# Patient Record
Sex: MALE | Race: WHITE | NOT HISPANIC OR LATINO
[De-identification: names, ages, dates, MRNs, and addresses within clinical notes are randomized per-mention and may not be internally consistent; named-entity substitution may affect disease eponyms.]

---

## 2022-02-08 ENCOUNTER — NON-APPOINTMENT (OUTPATIENT)
Age: 32
End: 2022-02-08

## 2022-02-08 ENCOUNTER — APPOINTMENT (OUTPATIENT)
Dept: NEUROLOGY | Facility: CLINIC | Age: 32
End: 2022-02-08
Payer: MEDICAID

## 2022-02-08 VITALS
WEIGHT: 150 LBS | BODY MASS INDEX: 19.88 KG/M2 | HEIGHT: 73 IN | DIASTOLIC BLOOD PRESSURE: 78 MMHG | SYSTOLIC BLOOD PRESSURE: 118 MMHG | HEART RATE: 69 BPM | TEMPERATURE: 97.2 F

## 2022-02-08 DIAGNOSIS — Z72.89 OTHER PROBLEMS RELATED TO LIFESTYLE: ICD-10-CM

## 2022-02-08 DIAGNOSIS — Z72.0 TOBACCO USE: ICD-10-CM

## 2022-02-08 PROCEDURE — 99204 OFFICE O/P NEW MOD 45 MIN: CPT

## 2022-02-09 PROBLEM — Z72.89 ALCOHOL USE: Status: ACTIVE | Noted: 2022-02-08

## 2022-02-09 PROBLEM — Z72.0 CURRENT NICOTINE USE: Status: ACTIVE | Noted: 2022-02-09

## 2022-02-09 NOTE — REVIEW OF SYSTEMS
[Feelings Of Weakness] : feelings of weakness [Fever] : no fever [Feeling Tired] : not feeling tired [Anxiety] : no anxiety [Depression] : no depression [Numbness] : no numbness [Dizziness] : no dizziness [Migraine Headache] : no migraine headache [Red Eyes] : eyes not red [Dry Eyes] : no dryness of the eyes [Loss Of Hearing] : no hearing loss [Nosebleeds] : no nosebleeds [Chest Pain] : no chest pain [Palpitations] : no palpitations [Shortness Of Breath] : no shortness of breath [Cough] : no cough [Constipation] : no constipation [Diarrhea] : no diarrhea [Genital Lesion] : no genital lesions [Joint Pain] : no joint pain [Joint Swelling] : no joint swelling [Itching] : no itching [Dry Skin] : no dry skin [Muscle Weakness] : no muscle weakness [Easy Bleeding] : no tendency for easy bleeding

## 2022-02-09 NOTE — DISCUSSION/SUMMARY
[FreeTextEntry1] : Raymundo is a pleasant 31-year-old man with past medical history of alcohol use, current smoker with at least 3 clinical events concerning for nocturnal seizures. Unclear if precipitated by alcohol use but the fact \par that he has 3 events, and the fact that they are nocturnal increases the likelihood that he carries a diagnosis of epilepsy. Suspect frontal lobe seizures. Will need to start medication soon but will first do EEG off medications. Discussed importance of limiting alcohol use and the high risk of serious injury with seizures including but not limited to brain bleed, shoulder dislocation, death. Nocturnal convulsive seizures are associated with SUDEP. Will need to do investigative studies and start AED soon. Likely will restart lamotrigine with another medication to bridge such as oxcarbazepine or levetiracetam. He may have some depression so oxcarbazepine may be best.

## 2022-02-09 NOTE — CONSULT LETTER
[Dear  ___] : Dear  [unfilled], [Consult Letter:] : I had the pleasure of evaluating your patient, [unfilled]. [Please see my note below.] : Please see my note below. [Sincerely,] : Sincerely, [FreeTextEntry3] : Radha Strickland MD \par [facility]

## 2022-02-09 NOTE — ASSESSMENT
[FreeTextEntry1] : Please get routine and ambulatory EEG (24 hour EEG) \par Call me after EEG - will review and will likely restart AED such as lamotrigine with oxcarbazepine bridge\par No driving\par Discussed seizure safety and limiting alcohol use \par Call immediately if seizure, call 911 if seizure > 5 minutes or if multiple seizures without return to baseline \par \par Return to clinic in one month \par \par \par .

## 2022-02-09 NOTE — PHYSICAL EXAM
[FreeTextEntry1] : Mental Status:alert, oriented. Speech fluent. Euthymic affect.\par CN: visual acuity, VFF, blink to confrontation \par III, IV, VI, PERRL, EOMI \par V sensation normal to light touch, pinprick\par VII normal squint vs resistance, normal smile, face symmetric \par VIII: normal hearing \par IX, X normal gag, symmetric palate, uvula raises midline \par XI normal shrug versus resistance and lateralization of head versus resistance \par XII tongue symmetric, normal strength, no tremor or fasciculation \par Sensory: normal to LT \par Motor: full strength \par Gait : normal balance and gait \par \par \par

## 2022-02-09 NOTE — HISTORY OF PRESENT ILLNESS
[FreeTextEntry1] : Raymundo Carlson is a 31-year-old right-handed man with a past medical history of seizures who is is presenting to [facility] care. He is not currently taking any medications. \par He previously saw Dr. Lizbeth Harrington, neurologist. \par \par With regard to seizure history, he had an episode in his mid-twenties when he woke up out of sleep. He had bitten his tongue. All the things in his living quarters were in disarray. \par The event was not witnessed. He urinated on himself. 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 He had chronic left shoulder pain which \par felt worse in the morning. He is not sure what happened but saw a neurologist who put him lamotrigine. He tolerated it well. He has had MRIs in the past but is not sure what they showed. He\par had EEG in the past, not sure what it showed. He had sleep studies as well which showed "frequent arousals from REM sleep". \par \par 2 years later, he had another episode where he had rolled out of bed. He hit his head on a heavy weight. He had staples. He bit his tongue and urinated on himself. He had knocked everything over. \par Dr. Harrington increased the dose of lamotrigine. He thought it also helped with his mood. \par \par 2 weeks ago, he had another event. He had been drinking alcohol and using cocaine. He laid down in a cot. His friend had mentioned that he had been walking around \par and speaking in a strange voice, confused. His entire body was sore. He is not sure what happened but now his upper back between his shoulder blades and his lower back hurt as \par well as his right shoulder. \par \par Family history: \par \par \par Family History\par Mother - alive - none \par Father - alive - depression/anxiety \par sister - alive - full sibling- none \par \par Seizure risk factors \par No history of febrile seizure \par umbical cord - , cord around neck\par born 10 days early - no nicu stay \par walking early. 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

## 2022-02-10 ENCOUNTER — APPOINTMENT (OUTPATIENT)
Dept: NEUROLOGY | Facility: CLINIC | Age: 32
End: 2022-02-10

## 2022-02-11 ENCOUNTER — APPOINTMENT (OUTPATIENT)
Dept: NEUROLOGY | Facility: CLINIC | Age: 32
End: 2022-02-11

## 2022-03-05 ENCOUNTER — RX RENEWAL (OUTPATIENT)
Age: 32
End: 2022-03-05

## 2022-03-06 ENCOUNTER — RX RENEWAL (OUTPATIENT)
Age: 32
End: 2022-03-06

## 2022-03-28 ENCOUNTER — APPOINTMENT (OUTPATIENT)
Dept: NEUROLOGY | Facility: CLINIC | Age: 32
End: 2022-03-28

## 2022-04-27 ENCOUNTER — APPOINTMENT (OUTPATIENT)
Dept: NEUROSURGERY | Facility: CLINIC | Age: 32
End: 2022-04-27
Payer: MEDICAID

## 2022-05-04 ENCOUNTER — APPOINTMENT (OUTPATIENT)
Dept: NEUROSURGERY | Facility: CLINIC | Age: 32
End: 2022-05-04
Payer: MEDICAID

## 2022-05-04 VITALS
HEIGHT: 73 IN | WEIGHT: 155 LBS | HEART RATE: 99 BPM | OXYGEN SATURATION: 94 % | TEMPERATURE: 98.2 F | DIASTOLIC BLOOD PRESSURE: 69 MMHG | SYSTOLIC BLOOD PRESSURE: 107 MMHG | BODY MASS INDEX: 20.54 KG/M2

## 2022-05-04 PROCEDURE — 99205 OFFICE O/P NEW HI 60 MIN: CPT

## 2022-05-04 NOTE — DATA REVIEWED
[de-identified] [facility]: [facility] mild compression deformity at T7 with multiple additional chronic compression deformities in the upper to midthoracic spine. [de-identified] : [facility]29[facility]\par There are multiple moderate compression fractures of the upper to midthoracic spine approximately and possible mild superior endplate compression of a midthoracic vertebral body. The alignment is normal.

## 2022-05-04 NOTE — ASSESSMENT
[FreeTextEntry1] : I have discussed the natural history and treatment options for T7 compression fracture with the patient and his father. I explained the indications for observation and imaging surveillance, medical management, brace and surgery (kyphoplasty). I discussed the risks, benefits, possible complications and expected outcome related to each treatment option. In the end, I recommend the patient wears a brace. It would also be reasonable to treat with kyphoplasty for pain relief as the MRI of the L spine with STIR sequence reveals a subacute component. The patient will think about these options and get back to us with his decision. If he decides to proceed with kyphoplasty,  I will refer him to my partner Dr. Cheryle Parks for the procedure and our office will reach out to him to schedule an appointment. He should follow up with his orthopedic surgeon for his right shoulder pain. The patient understands the plan of care and is in agreement.  All questions answered to patient satisfaction.\par

## 2022-05-04 NOTE — HISTORY OF PRESENT ILLNESS
[de-identified] : NICOLE RUIZ is a 31 year male with a PMH of seizure disorder, Lyme disease, anxiety, depression, Etoh use, current smoker who presents to the office today with his father for neurosurgical consultation due to thoracic compression fractures sustained during recent seizure.  Nocturnal seizure disorder diagnosed at age 14, was on lamotrigine and self dc'd a few years ago. Had a significant nocturnal seizure on 1/25/22 and then developed mid-back pain and difficulty walking. Pain was steadily improving and then the patient had another seizure 1 months ago which again resulted in severe mid-back pain which is not improving. He is now following with Dr. Strickland for seizure disorder and back on lamotrigine. He also reports Right shoulder pain.The back pain is characterized as stabbing and burning in nature, 7/10 in severity, and localized to the upper to mid back.  It started 3 months ago. It is exacerbated by sitting, standing and lying down and relieved by cannabis and Etoh.  The patient denies numbness of extremities, weakness of extremities, bowel or bladder incontinence and gait disturbance.  He has tried pain medications including Mobic without relief. He has undergone imaging in the form of Xray Thoracic spine on 3/29/22 and MRI Thoracic Spine on 4/29/22 at [facility] (images in [facility]) which revealed subacute mild compression deformity at T7 with multiple additional chronic compression deformities in the upper to midthoracic spine (see detailed report scanned in EMR).\par PMH: per HPI\par PSH:testicular torsion age 7, Left shoulder surgery 2015\par Social Hx: pack/day smoker since age 14, drinks alcohol >10 beers/week, smokes marijuana, lives at home with parents, unemployed\par Allergies: NKDA\par Medications: lamotragine 50mg BID, Mobic 15mg QD, denies AC or ASA\par \par

## 2022-05-04 NOTE — END OF VISIT
[FreeTextEntry3] : I have seen the patient and reviewed the case together with PA and I agree with the final recommendations and plan of care.\par \par Arash Zelaya MD\par Neurosurgery\par \par  [Time Spent: ___ minutes] : I have spent [unfilled] minutes of time on the encounter. [>50% of the face to face encounter time was spent on counseling and/or coordination of care for ___] : Greater than 50% of the face to face encounter time was spent on counseling and/or coordination of care for [unfilled]

## 2022-05-09 ENCOUNTER — NON-APPOINTMENT (OUTPATIENT)
Age: 32
End: 2022-05-09

## 2022-05-18 ENCOUNTER — APPOINTMENT (OUTPATIENT)
Dept: NEUROLOGY | Facility: CLINIC | Age: 32
End: 2022-05-18
Payer: MEDICAID

## 2022-05-18 VITALS
SYSTOLIC BLOOD PRESSURE: 129 MMHG | BODY MASS INDEX: 20.54 KG/M2 | WEIGHT: 155 LBS | HEIGHT: 73 IN | HEART RATE: 88 BPM | DIASTOLIC BLOOD PRESSURE: 75 MMHG

## 2022-05-18 DIAGNOSIS — Z87.898 PERSONAL HISTORY OF OTHER SPECIFIED CONDITIONS: ICD-10-CM

## 2022-05-18 PROCEDURE — 99215 OFFICE O/P EST HI 40 MIN: CPT

## 2022-05-18 NOTE — HISTORY OF PRESENT ILLNESS
[FreeTextEntry1] : Last seen in clinic on 22. Presents to clinic with dad. Did not complete ambulatory EEG and discontinued oxcarbazepine. He states that he could not tolerate it. He is taking lamotrigine 75 mg twice a day now but reports that it makes him almost too level, as if he can't feel anything. He sometimes feels depressed and admits to drinking about 15 beers a week, on average 3-4 beers or more a day.\par \par He unfortunately sustained another seizure in 2022? It occurred out of sleep. He doesn't remember it but experienced severe back pain and had bit both sides of his tongue. He remembers being stressed about a potential love interest and drank more than usual. He also smokes marijuana (not as much edibles) but does this less frequently than drinking. \par \par He has also been driving. Lives with parents. Not working. \par \par Does have history of depression and is feeling depressed now. \par On 22 and imaging revealed a compression fracture of T7 vertebra. This was likely in setting of seizure in January and then worsened by seizure in March. He saw Dr. Zelaya who reviewed imaging and recommended a brace (not wearing today) and consideration of possible kyphoplasty. \par \par Medication Failed: \par Oxcarbazepine: felt depressed \par \par ________________________________\par 22 \par Raymundo Carlson is a 31-year-old right-handed man with a past medical history of seizures who is is presenting to [facility] care. He is not currently taking any medications. \par He previously saw Dr. Lizbeth Harrington, neurologist. \par \par With regard to seizure history, he had an episode in his mid-twenties when he woke up out of sleep. He had bitten his tongue. All the things in his living quarters were in disarray. \par The event was not witnessed. He urinated on himself. 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 He had chronic left shoulder pain which \par felt worse in the morning. He is not sure what happened but saw a neurologist who put him lamotrigine. He tolerated it well. He has had MRIs in the past but is not sure what they showed. He\par had EEG in the past, not sure what it showed. He had sleep studies as well which showed "frequent arousals from REM sleep". \par \par 2 years later, he had another episode where he had rolled out of bed. He hit his head on a heavy weight. He had staples. He bit his tongue and urinated on himself. He had knocked everything over. \par Dr. Harrington increased the dose of lamotrigine. He thought it also helped with his mood. \par \par 2 weeks ago, he had another event. He had been drinking alcohol and using cocaine. He laid down in a cot. His friend had mentioned that he had been walking around \par and speaking in a strange voice, confused. His entire body was sore. He is not sure what happened but now his upper back between his shoulder blades and his lower back hurt as \par well as his right shoulder. \par \par Family history: \par \par \par Family History\par Mother - alive - none \par Father - alive - depression/anxiety \par sister - alive - full sibling- none \par \par Seizure risk factors \par No history of febrile seizure \par umbical cord - , cord around neck\par born 10 days early - no nicu stay \par walking early. 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

## 2022-05-18 NOTE — PHYSICAL EXAM
[FreeTextEntry1] : Mental Status:alert, oriented. Speech fluent. Euthymic affect.\par CN: visual acuity, VFF, blink to confrontation \par III, IV, VI, PERRL, EOMI \par V sensation normal to light touch, pinprick\par VII normal squint vs resistance, normal smile, face symmetric \par VIII: normal hearing \par IX, X normal gag, symmetric palate, uvula raises midline \par XI normal shrug versus resistance and lateralization of head versus resistance \par XII tongue symmetric, normal strength, no tremor or fasciculation \par Sensory: normal to LT \par Motor: full strength in upper and lower extremities \par Reflexes: 2+ throughout \par Coordination: intact FNF\par Gait : normal balance and gait \par \par \par

## 2022-05-18 NOTE — ASSESSMENT
[FreeTextEntry1] : Please start zonisamide 100 mg daily at night \par Please increase lamotrigine: \par Week 1: lamotrigine 75 mg in the morning 100 mg at night \par Week 2: lamotrigine 100 mg twice a day\par Week 3: lamotrigine 125 mg in the morning and 100 mg at night \par Week 4: lamotrigine 125 mg twice a day \par Week 5: lamotrigine 150 mg in morning and 125 mg at night \par Week 6: lamotrigine 150 mg twice a day - continue at this dose \par \par No driving \par Take vitamin b complex daily and vitamin D daily \par \par Return to clinic in 1-2 months

## 2022-05-18 NOTE — DISCUSSION/SUMMARY
[FreeTextEntry1] : Raymundo is a pleasant 31-year-old man with past medical history of alcohol use, current smoker with at least 3 clinical events concerning for nocturnal seizures. Unclear if precipitated by alcohol use but the fact \par that he has 3 events, and the fact that they are nocturnal increases the likelihood that he carries a diagnosis of epilepsy. Suspect frontal lobe seizures however no EEG data. Discussed importance of limiting alcohol use and the high risk of serious injury with seizures including but not limited to brain bleed, shoulder dislocation, death. Nocturnal convulsive seizures are associated with SUDEP. Started lamotrigine and oxcarbazepine. He self-discontinued oxcarbazepine. He is drinking 15 beers a week and sustained another seizure in March 2022 , also in setting of alcohol use. He was found to have a T7 compression fracture. Seen by Dr. Zelaya, recommended for brace and consideration of kyphoplasty. \par

## 2022-05-30 NOTE — REASON FOR VISIT
[Follow-Up: _____] : a [unfilled] follow-up visit [Other: _____] : [unfilled] [FreeTextEntry1] : Pt states he had 1 seizure episode since last visit due to not taking medication, but since then has restarted taking medication. Yes

## 2022-06-07 ENCOUNTER — RX RENEWAL (OUTPATIENT)
Age: 32
End: 2022-06-07

## 2022-07-07 ENCOUNTER — APPOINTMENT (OUTPATIENT)
Dept: NEUROLOGY | Facility: CLINIC | Age: 32
End: 2022-07-07

## 2022-07-07 VITALS
HEART RATE: 80 BPM | WEIGHT: 155 LBS | BODY MASS INDEX: 20.99 KG/M2 | SYSTOLIC BLOOD PRESSURE: 122 MMHG | HEIGHT: 72 IN | DIASTOLIC BLOOD PRESSURE: 75 MMHG

## 2022-07-07 PROCEDURE — 99215 OFFICE O/P EST HI 40 MIN: CPT

## 2022-07-07 NOTE — ASSESSMENT
[FreeTextEntry1] : \par Continue lamotrigine 150 mg twice a day \par Continue zonisamide 100 mg at night \par Take daily vitamin b complex\par Take vitamin d 1000 units daily \par \par Please get an EEG  -we will call you to schedule\par Please do labs

## 2022-07-07 NOTE — HISTORY OF PRESENT ILLNESS
[FreeTextEntry1] : Presenting with father to clinic. Doing well. No interval events concerning for seizure. trying to be mostly compliant with medications. Tolerating them well. Feels less depressed. More hopeful. A lot of social stressors. Dad, who is present in the office today, has treatment refractory depression. \par \par Raymundo is trying to cut down on alcohol, drinks ~1-2 beers, not more a day, most days of the week. Smokes marijuana. Not driving. \par \par Current AEDS. \par zonisamide 100 mg qd \par lamotrigine 150 mg bid \par __________________\par \par Last seen in clinic on 22. Presents to clinic with dad. Did not complete ambulatory EEG and discontinued oxcarbazepine. He states that he could not tolerate it. He is taking lamotrigine 75 mg twice a day now but reports that it makes him almost too level, as if he can't feel anything. He sometimes feels depressed and admits to drinking about 15 beers a week, on average 3-4 beers or more a day.\par \par He unfortunately sustained another seizure in 2022? It occurred out of sleep. He doesn't remember it but experienced severe back pain and had bit both sides of his tongue. He remembers being stressed about a potential love interest and drank more than usual. He also smokes marijuana (not as much edibles) but does this less frequently than drinking. \par \par He has also been driving. Lives with parents. Not working. \par [place] Does have history of depression and is feeling depressed now. \par on 22 and imaging revealed a compression fracture of T7 vertebra. This was likely in setting of seizure in January and then worsened by seizure in March. He saw Dr. Zelaya who reviewed imaging and recommended a brace (not wearing today) and consideration of possible kyphoplasty. \par \par ________________________________\par 22 \par Raymundo Carlson is a 31-year-old right-handed man with a past medical history of seizures who is is presenting to [facility]. He is not currently taking any medications. \par He previously saw Dr. Lizbeth Harrington, neurologist. \par \par With regard to seizure history, he had an episode in his mid-twenties when he woke up out of sleep. He had bitten his tongue. All the things in his living quarters were in disarray. \par The event was not witnessed. He urinated on himself. 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 He had chronic left shoulder pain which \par felt worse in the morning. He is not sure what happened but saw a neurologist who put him lamotrigine. He tolerated it well. He has had MRIs in the past but is not sure what they showed. He\par had EEG in the past, not sure what it showed. He had sleep studies as well which showed "frequent arousals from REM sleep". \par \par 2 years later, he had another episode where he had rolled out of bed. He hit his head on a heavy weight. He had staples. He bit his tongue and urinated on himself. He had knocked everything over. \par Dr. Harrington increased the dose of lamotrigine. He thought it also helped with his mood. \par \par 2 weeks ago, he had another event. He had been drinking alcohol and using cocaine. He laid down in a cot. His friend had mentioned that he had been walking around \par and speaking in a strange voice, confused. His entire body was sore. He is not sure what happened but now his upper back between his shoulder blades and his lower back hurt as \par well as his right shoulder. \par \par Family history: \par \par \par Family History\par Mother - alive - none \par Father - alive - depression/anxiety \par sister - alive - full sibling- none \par \par Seizure risk factors \par No history of febrile seizure \par umbical cord - , cord around neck\par born 10 days early - no nicu stay \par walking early. 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

## 2022-07-07 NOTE — DISCUSSION/SUMMARY
[FreeTextEntry1] : Raymundo is a pleasant 31-year-old man with past medical history of alcohol use, current smoker with at least 3 clinical events concerning for nocturnal seizures. Unclear if precipitated by alcohol use but the fact \par that he has 3 events, and the fact that they are nocturnal increases the likelihood that he carries a diagnosis of epilepsy. Suspect frontal lobe seizures however no EEG data. Discussed importance of limiting alcohol use and the high risk of serious injury with seizures including but not limited to brain bleed, shoulder dislocation, death. Nocturnal convulsive seizures are associated with SUDEP. Started lamotrigine and oxcarbazepine. He self-discontinued oxcarbazepine. He is drinking 15 beers a week and sustained another seizure in March 2022 , also in setting of alcohol use. He was found to have a T7 compression fracture. Seen by Dr. Zelaya, recommended for brace and consideration of kyphoplasty. \par \par Doing well now on zonisamide 100 mg daily and lamotrigine 150 mg twice a day. Will continue. \par \par \par

## 2022-07-07 NOTE — PHYSICAL EXAM
[FreeTextEntry1] : Mental Status:alert, oriented. Speech fluent. Euthymic affect.\par CN: visual acuity, VFF, blink to confrontation \par III, IV, VI, PERRL, EOMI \par V sensation normal to light touch, pinprick\par VII normal squint vs resistance, normal smile, face symmetric \par VIII: normal hearing \par IX, X normal gag, symmetric palate, uvula raises midline \par XI normal shrug versus resistance and lateralization of head versus resistance \par XII tongue symmetric, normal strength, no tremor or fasciculation \par Sensory: normal to LT \par Motor: full strength , no tremor \par Reflexes: 2+ throughout \par Coordination: intact FNF\par Gait : normal balance and gait \par \par \par

## 2022-08-09 ENCOUNTER — APPOINTMENT (OUTPATIENT)
Dept: NEUROLOGY | Facility: CLINIC | Age: 32
End: 2022-08-09

## 2022-08-10 ENCOUNTER — APPOINTMENT (OUTPATIENT)
Dept: NEUROLOGY | Facility: CLINIC | Age: 32
End: 2022-08-10

## 2022-09-16 ENCOUNTER — APPOINTMENT (OUTPATIENT)
Dept: NEUROLOGY | Facility: CLINIC | Age: 32
End: 2022-09-16

## 2022-09-16 VITALS
DIASTOLIC BLOOD PRESSURE: 71 MMHG | OXYGEN SATURATION: 98 % | HEIGHT: 72 IN | BODY MASS INDEX: 20.99 KG/M2 | WEIGHT: 155 LBS | HEART RATE: 81 BPM | TEMPERATURE: 98.7 F | SYSTOLIC BLOOD PRESSURE: 107 MMHG

## 2022-09-16 LAB
25(OH)D3 SERPL-MCNC: 27.9 NG/ML
ALBUMIN SERPL ELPH-MCNC: 4.9 G/DL
ALP BLD-CCNC: 55 U/L
ALT SERPL-CCNC: 19 U/L
ANION GAP SERPL CALC-SCNC: 10 MMOL/L
AST SERPL-CCNC: 18 U/L
BILIRUB SERPL-MCNC: 0.3 MG/DL
BUN SERPL-MCNC: 13 MG/DL
CALCIUM SERPL-MCNC: 10.1 MG/DL
CHLORIDE SERPL-SCNC: 107 MMOL/L
CO2 SERPL-SCNC: 26 MMOL/L
CREAT SERPL-MCNC: 0.8 MG/DL
EGFR: 120 ML/MIN/1.73M2
GLUCOSE SERPL-MCNC: 99 MG/DL
LAMOTRIGINE SERPL-MCNC: 5.1 UG/ML
POTASSIUM SERPL-SCNC: 4.7 MMOL/L
PROT SERPL-MCNC: 7.2 G/DL
SODIUM SERPL-SCNC: 143 MMOL/L
ZONISAMIDE SERPL-MCNC: <2 UG/ML

## 2022-09-16 PROCEDURE — 99215 OFFICE O/P EST HI 40 MIN: CPT

## 2022-09-16 NOTE — ASSESSMENT
[FreeTextEntry1] : Call Dr. Zelaya's office and make a follow-up for persistent back pain\par Continue physical therapy\par Continue to cut down on alcohol \par Continue lamotrigine 150 mg twice a day\par Continue zonisamide 100 mg at night \par Continue vitamin D 1000 units daily \par Please get ambulatory EEG\par No driving \par \par Return to clinic in November, call if seizure\par  \par \par

## 2022-09-16 NOTE — PHYSICAL EXAM
[FreeTextEntry1] : Mental Status:alert, oriented. Speech fluent. Euthymic affect.\par CN: visual acuity, VFF, blink to confrontation \par III, IV, VI, PERRL, EOMI \par V sensation normal to light touch, pinprick\par VII normal squint vs resistance, normal smile, face symmetric \par VIII: normal hearing \par IX, X normal gag, symmetric palate, uvula raises midline \par XI normal shrug versus resistance and lateralization of head versus resistance \par XII tongue symmetric, normal strength, no tremor or fasciculation \par Sensory: normal to LT \par Motor: full strength , no tremor. Normal bulk and tone\par Reflexes: 2+ upper extremities, 3+ patellar b/l, 2+ ankle jerks b/l\par Coordination: intact FNF\par Gait : normal balance and gait \par \par \par

## 2022-09-16 NOTE — DISCUSSION/SUMMARY
[FreeTextEntry1] : Raymundo is a pleasant 31-year-old man with past medical history of alcohol abuse, current smoker with at least 3 clinical events concerning for nocturnal seizures. Unclear if precipitated by alcohol use but the fact that he has 3 events, and the fact that they are nocturnal increases the likelihood that he carries a diagnosis of epilepsy. Suspect frontal lobe seizures however no EEG data. Discussed importance of limiting alcohol use and the high risk of serious injury with seizures including but not limited to brain bleed, shoulder dislocation, death. Nocturnal convulsive seizures are associated with SUDEP. Started lamotrigine and oxcarbazepine. He self-discontinued oxcarbazepine. He was drinking 15 beers a week and sustained another seizure in March 2022 , also in setting of alcohol use. He was found to have a T7 compression fracture. Seen by Dr. Zelaya, recommended for brace and consideration of kyphoplasty. \par \par Doing well now on zonisamide 100 mg daily and lamotrigine 150 mg twice a day. Will continue. Has not gotten EEG yet. Still with pain in back that was worse from when he saw Dr. Zelaya. No concerning findings on neurologic exam. WIll refer back to him. \par \par Zonisamide level was low (<2). Discussed importance of compliance - and risks including worsening of fracture were he to have a convulsive seizure. Discussed importance of not driving. \par \par \par

## 2022-09-16 NOTE — HISTORY OF PRESENT ILLNESS
[FreeTextEntry1] : Last seen in clinic on 22. No interval events concerning for seizure. Admits to missing a couple doses of zonisamide. Cutting down on drinking. Now has less than a six pack a week, still drinks 3-4 times a week. Smokes marijuana. Trying to cut down on cigarettes from 10 to 5. Going to physical therapy. Worsening back pain - it is bad in upper back and under ribs and sternum.  Tried to wear brace and it made things worse. Has not gotten EEG. Drove intermittently to the grocery store. Lives with parents. Not working. Doing yard work and helping around the house. Mood is better. Can tolerate these medications. Going to physical therapy. \par \par Current Medications: \par lamotrigine 150 mg twice a day \par zonisamide 100 mg at night \par ______________________________________\par 22 \par Presenting with father to clinic. Doing well. No interval events concerning for seizure. trying to be mostly compliant with medications. Tolerating them well. Feels less depressed. More hopeful. A lot of social stressors. Dad, who is present in the office today, has treatment refractory depression. \par \par Raymundo is trying to cut down on alcohol, drinks ~1-2 beers, not more a day, most days of the week. Smokes marijuana. Not driving. \par \par Current AEDS. \par zonisamide 100 mg qd \par lamotrigine 150 mg bid \par __________________\par \par Last seen in clinic on 22. Presents to clinic with dad. Did not complete ambulatory EEG and discontinued oxcarbazepine. He states that he could not tolerate it. He is taking lamotrigine 75 mg twice a day now but reports that it makes him almost too level, as if he can't feel anything. He sometimes feels depressed and admits to drinking about 15 beers a week, on average 3-4 beers or more a day.\par \par He unfortunately sustained another seizure in 2022? It occurred out of sleep. He doesn't remember it but experienced severe back pain and had bit both sides of his tongue. He remembers being stressed about a potential love interest and drank more than usual. He also smokes marijuana (not as much edibles) but does this less frequently than drinking. \par \par He has also been driving. Lives with parents. Not working. \par \jesús Does have history of depression and is feeling depressed now. \par on 22 and imaging revealed a compression fracture of T7 vertebra. This was likely in setting of seizure in January and then worsened by seizure in March. He saw Dr. Zelaya who reviewed imaging and recommended a brace (not wearing today) and consideration of possible kyphoplasty. \par \par ________________________________\par 22 \par Raymundo Carlson is a 31-year-old right-handed man with a past medical history of seizures who is is presenting to [facility] care. He is not currently taking any medications. \par He previously saw Dr. Lizbeth Harrington, neurologist. \par \par With regard to seizure history, he had an episode in his mid-twenties when he woke up out of sleep. He had bitten his tongue. All the things in his living quarters were in disarray. \par The event was not witnessed. He urinated on himself. 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 He had chronic left shoulder pain which \par felt worse in the morning. He is not sure what happened but saw a neurologist who put him lamotrigine. He tolerated it well. He has had MRIs in the past but is not sure what they showed. He\par had EEG in the past, not sure what it showed. He had sleep studies as well which showed "frequent arousals from REM sleep". \par \par 2 years later, he had another episode where he had rolled out of bed. He hit his head on a heavy weight. He had staples. He bit his tongue and urinated on himself. He had knocked everything over. \par Dr. Harrington increased the dose of lamotrigine. He thought it also helped with his mood. \par \par 2 weeks ago, he had another event. He had been drinking alcohol and using cocaine. He laid down in a cot. His friend had mentioned that he had been walking around \par and speaking in a strange voice, confused. His entire body was sore. He is not sure what happened but now his upper back between his shoulder blades and his lower back hurt as \par well as his right shoulder. \par \par Family history: \par \par \par Family History\par Mother - alive - none \par Father - alive - depression/anxiety \par sister - alive - full sibling- none \par \par Seizure risk factors \par No history of febrile seizure \par umbical cord - , cord around neck\par born 10 days early - no nicu stay \par walking early. 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

## 2022-09-16 NOTE — DATA REVIEWED
[de-identified] : Labs: 9/2022 - lamotrigine 5.1 ; zonisamide <2   ; vitamin D 27.9 low \par 4/29/22: MRI thoracic spine: subacute mild compression deformity at T7 with multiple additional compression deformities in \par upper to midthoracic spine. \par 6/29/21 \par Na 140 \par creatinine 0.9

## 2022-09-19 LAB — OXCARBAZEPINE SERPL-MCNC: <1 UG/ML

## 2022-09-23 ENCOUNTER — NON-APPOINTMENT (OUTPATIENT)
Age: 32
End: 2022-09-23

## 2022-11-03 ENCOUNTER — APPOINTMENT (OUTPATIENT)
Dept: NEUROLOGY | Facility: CLINIC | Age: 32
End: 2022-11-03

## 2022-11-04 ENCOUNTER — APPOINTMENT (OUTPATIENT)
Dept: NEUROLOGY | Facility: CLINIC | Age: 32
End: 2022-11-04

## 2023-01-06 DIAGNOSIS — S22.080A WEDGE COMPRESSION FRACTURE OF T11-T12 VERTEBRA, INITIAL ENCOUNTER FOR CLOSED FRACTURE: ICD-10-CM

## 2023-01-06 DIAGNOSIS — S32.010A WEDGE COMPRESSION FRACTURE OF T11-T12 VERTEBRA, INITIAL ENCOUNTER FOR CLOSED FRACTURE: ICD-10-CM

## 2023-01-23 ENCOUNTER — APPOINTMENT (OUTPATIENT)
Dept: NEUROSURGERY | Facility: CLINIC | Age: 33
End: 2023-01-23
Payer: MEDICAID

## 2023-01-24 ENCOUNTER — RX RENEWAL (OUTPATIENT)
Age: 33
End: 2023-01-24

## 2023-01-31 ENCOUNTER — RESULT REVIEW (OUTPATIENT)
Age: 33
End: 2023-01-31

## 2023-02-06 ENCOUNTER — APPOINTMENT (OUTPATIENT)
Dept: NEUROSURGERY | Facility: CLINIC | Age: 33
End: 2023-02-06
Payer: MEDICAID

## 2023-02-06 VITALS
DIASTOLIC BLOOD PRESSURE: 87 MMHG | WEIGHT: 161 LBS | BODY MASS INDEX: 21.81 KG/M2 | OXYGEN SATURATION: 98 % | HEIGHT: 72 IN | HEART RATE: 97 BPM | SYSTOLIC BLOOD PRESSURE: 121 MMHG

## 2023-02-06 PROCEDURE — 99215 OFFICE O/P EST HI 40 MIN: CPT

## 2023-02-06 NOTE — ASSESSMENT
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 I explained the indications for observation and imaging surveillance, medical management, brace and surgery (kyphoplasty). I discussed the risks, benefits, possible complications and expected outcome related to each treatment option.\par \par In the end, I recommend conservative management in the form of physical therapy and pain management.  The patient may return to the office as needed at this point.  The patient understands the plan of care and is in agreement.  All questions answered to patient satisfaction.\par \par \par

## 2023-02-06 NOTE — PHYSICAL EXAM
[FreeTextEntry1] : Constitutional: alert and in no acute distress. \par Cranial Nerves: visual acuity intact bilaterally, pupils equal round and reactive to light, extraocular motion intact, facial sensation intact symmetrically, face symmetrical, hearing was intact bilaterally, tongue and palate midline, head turning and shoulder shrug symmetric and there was no tongue deviation with protrusion. \par Motor: muscle tone was normal in all four extremities, muscle strength was normal in all four extremities and normal bulk in all four extremities. \par +Tend to palpation over multiple levels thoracic spine\par \par \par

## 2023-02-06 NOTE — HISTORY OF PRESENT ILLNESS
[FreeTextEntry1] : Mr. Ruiz was seen in neurosurgical follow up.  Previous notes in the medical record and interval history reviewed with the patient.  In summary, he is a 32 year-old male with seizure disorder seen in our office last year after sustaining a T7 compression fracture as the result of a seizure episode.  He was offered referral for kyphoplasty at that time but instead opted for conservative treatment modalities.  He has been experiencing persistent back pain for approximately 3 months that occurred after a PT session.  He presents today with updated imaging consisting of MRI T/L spine (full details below).  He reports no extremity numbness/weakness, gait instability, or bowel/bladder incontinence.  \par \par 5/4/22: NICOLE RUIZ is a 31 year male with a PMH of seizure disorder, Lyme disease, anxiety, depression, Etoh use, current smoker who presents to the office today with his father for neurosurgical consultation due to thoracic compression fractures sustained during recent seizure. Nocturnal seizure disorder diagnosed at age 14, was on lamotrigine and self dc'd a few years ago. Had a significant nocturnal seizure on 1/25/22 and then developed mid-back pain and difficulty walking. Pain was steadily improving and then the patient had another seizure 1 months ago which again resulted in severe mid-back pain which is not improving. He is now following with Dr. Strickland for seizure disorder and back on lamotrigine. He also reports Right shoulder pain.The back pain is characterized as stabbing and burning in nature, 7/10 in severity, and localized to the upper to mid back. It started 3 months ago. It is exacerbated by sitting, standing and lying down and relieved by cannabis and Etoh. The patient denies numbness of extremities, weakness of extremities, bowel or bladder incontinence and gait disturbance. He has tried pain medications including Mobic without relief. He has undergone imaging in the form of Xray Thoracic spine on 3/29/22 and MRI Thoracic Spine on 4/29/22 at [facility] (images in [employer]) which revealed subacute mild compression deformity at T7 with multiple additional chronic compression deformities in the upper to midthoracic spine (see detailed report scanned in EMR).\par PSH:testicular torsion age 7, Left shoulder surgery 2015\par Social Hx: pack/day smoker since age 14, drinks alcohol >10 beers/week, smokes marijuana, lives at home with parents, unemployed\par Allergies: NKDA\par Medications: lamotragine 50mg BID, Mobic 15mg QD, denies AC or ASA

## 2023-02-06 NOTE — DATA REVIEWED
[de-identified] : \par  MRI Report             Final\par \par No Documents Attached\par \par \par \par \par   [facility]\par                                          [address]\par                                    [place], [place]  [address]\par                                        Department of Radiology\par                                             [phone]\par \par \par Patient Name:      NICOLE RUIZ                 Location:       [facility]\par Med Rec #:        [id number]                    Account #:      [id number]\par YOB: 1990                    Ordering:       Natasha Perez\par Age: 32               Sex:    M                 Attending:      Natasha Perez\par PCP:        Lowell Velez MD\par ______________________________________________________________________________________\par \par Exam Date:      01/31/23\par Exam:         MRI LUMBAR SPINE; MRI THORACIC SPINE\par \par Order#:       [id number]; [id number]\par \par \par \par CLINICAL INFORMATION: Back pain\par \par  TECHNIQUE: Multiplanar, multisequence MRI was performed of the lumbar spine.\par  IV Contrast: NONE\par \par  PRIOR STUDIES: No priors available for comparison.\par \par  FINDINGS:\par \par  LOCALIZER: No additional findings.\par  BONES: There is no fracture or bone marrow edema. There are chronic appearing compression\par deformities of T4, T5, T6 and T7 as well as superior endplate deformities of T2, T3 and T8.\par  ALIGNMENT: The alignment is normal.\par  SACROILIAC JOINTS/SACRUM: There is no sacral fracture. The SI joints are partially visualized but\par are intact.\par  CONUS AND CAUDA EQUINA: The distal cord and conus are normal in signal. Conus terminates at L1.\par  VISUALIZED INTRAPELVIC/INTRA-ABDOMINAL SOFT TISSUES: Normal.\par  PARASPINAL SOFT TISSUES: Normal.\par \par \par  INDIVIDUAL LEVELS:\par \par  THORACIC SPINE: No spinal canal or neuroforaminal stenosis.\par \par  L1-L2: No spinal canal or neuroforaminal stenosis.\par  L2-L3: No spinal canal or neuroforaminal stenosis.\par  L3-L4: No spinal canal or neuroforaminal stenosis.\par  L4-L5: No spinal canal or neuroforaminal stenosis.\par  L5-S1: Broad-based posterior disc bulge and moderate bilateral facet arthropathy result in mild\par bilateral neural foraminal narrowing but no significant central canal narrowing.\par \par \par \par  IMPRESSION:\par \par  No acute fracture or subluxation of the thoracic or lumbar spine.\par \par  Chronic compression deformities of several upper/mid thoracic vertebral bodies.\par \par  Mild bilateral neural foraminal narrowing at L5-S1 secondary to posterior disc bulge and facet\par arthropathy. No additional areas of significant central canal or neural foraminal narrowing within\par the thoracic or lumbar spine.\par \par  --- End of Report ---\par \par ***Electronically Signed ***\par -----------------------------------------------\par NICHOLAS MARROQUIN MD              02/01/23 1354\par \par Dictated on 02/01/23\par \par \par Report cc:  Natasha Perez;\par \par  \par \par  Ordered by: NATASHA PEREZ       Collected/Examined: [id number] 05:14PM       \par Verification Required       Stage: Final       \par  Performed at: [facility]       Resulted: 01Feb2023 01:54PM       Last Updated: 01Feb2023 01:58PM       Accession: [id number]

## 2023-02-10 ENCOUNTER — APPOINTMENT (OUTPATIENT)
Dept: NEUROLOGY | Facility: CLINIC | Age: 33
End: 2023-02-10
Payer: MEDICAID

## 2023-02-10 VITALS
HEIGHT: 72 IN | BODY MASS INDEX: 21.81 KG/M2 | DIASTOLIC BLOOD PRESSURE: 84 MMHG | WEIGHT: 161 LBS | OXYGEN SATURATION: 98 % | SYSTOLIC BLOOD PRESSURE: 132 MMHG | HEART RATE: 100 BPM

## 2023-02-10 PROCEDURE — 99215 OFFICE O/P EST HI 40 MIN: CPT

## 2023-02-10 RX ORDER — LAMOTRIGINE 100 MG/1
100 TABLET ORAL
Qty: 60 | Refills: 3 | Status: DISCONTINUED | COMMUNITY
Start: 2022-06-07 | End: 2023-02-10

## 2023-02-10 RX ORDER — CHOLECALCIFEROL (VITAMIN D3) 25 MCG
25 MCG TABLET ORAL DAILY
Qty: 30 | Refills: 5 | Status: DISCONTINUED | COMMUNITY
Start: 2022-07-07 | End: 2023-02-10

## 2023-02-10 RX ORDER — LAMOTRIGINE 25 MG/1
25 TABLET ORAL
Qty: 120 | Refills: 0 | Status: DISCONTINUED | COMMUNITY
Start: 2022-02-10 | End: 2023-02-10

## 2023-02-10 RX ORDER — LAMOTRIGINE 150 MG/1
150 TABLET ORAL TWICE DAILY
Qty: 180 | Refills: 3 | Status: DISCONTINUED | COMMUNITY
Start: 2022-10-03 | End: 2023-02-10

## 2023-02-10 RX ORDER — OXCARBAZEPINE 300 MG/1
300 TABLET, FILM COATED ORAL
Qty: 120 | Refills: 0 | Status: DISCONTINUED | COMMUNITY
Start: 2022-02-10 | End: 2023-02-10

## 2023-02-10 RX ORDER — ZONISAMIDE 100 MG/1
100 CAPSULE ORAL
Qty: 30 | Refills: 1 | Status: DISCONTINUED | COMMUNITY
Start: 2022-06-07 | End: 2023-02-10

## 2023-02-10 RX ORDER — LAMOTRIGINE 150 MG/1
150 TABLET ORAL TWICE DAILY
Qty: 60 | Refills: 3 | Status: DISCONTINUED | COMMUNITY
Start: 2022-07-07 | End: 2023-02-10

## 2023-02-10 RX ORDER — B-COMPLEX WITH VITAMIN C
TABLET ORAL DAILY
Qty: 30 | Refills: 5 | Status: ACTIVE | COMMUNITY
Start: 2022-05-18 | End: 1900-01-01

## 2023-02-10 RX ORDER — ZONISAMIDE 100 MG/1
100 CAPSULE ORAL
Qty: 30 | Refills: 1 | Status: DISCONTINUED | COMMUNITY
Start: 2022-05-18 | End: 2023-02-10

## 2023-02-10 RX ORDER — LAMOTRIGINE 100 MG/1
100 TABLET ORAL
Qty: 60 | Refills: 1 | Status: DISCONTINUED | COMMUNITY
Start: 2022-05-18 | End: 2023-02-10

## 2023-02-10 RX ORDER — LAMOTRIGINE 25 MG/1
25 TABLET ORAL
Qty: 320 | Refills: 0 | Status: DISCONTINUED | COMMUNITY
Start: 2022-05-09 | End: 2023-02-10

## 2023-02-10 NOTE — DATA REVIEWED
[de-identified] : 2/1/23: no acute fracture or subluxation of the thoracic or lumbar spine. Chronic compression \par deformities of several upper/mid thoracic vertebral bodies. Mild bilateral foraminal narrowing \par at L5/S1 secondary to posterior disc bulge and facet arthropathy. No additional areas of significant \par central canal or neural foraminal narrowing within the thoracic or lumbar spine.  [de-identified] : Labs: 9/2022 - lamotrigine 5.1 ; zonisamide <2   ; vitamin D 27.9 low \par 4/29/22: MRI thoracic spine: subacute mild compression deformity at T7 with multiple additional compression deformities in \par upper to midthoracic spine. \par 6/29/21 \par Na 140 \par creatinine 0.9

## 2023-02-10 NOTE — PHYSICAL EXAM
[FreeTextEntry1] : Mental Status:alert, oriented. Speech fluent. Euthymic affect.\par CN: visual acuity, VFF, blink to confrontation \par III, IV, VI, PERRL, EOMI \par V sensation normal to light touch, pinprick\par VII normal squint vs resistance, normal smile, face symmetric \par VIII: normal hearing \par IX, X normal gag, symmetric palate, uvula raises midline \par XI normal shrug versus resistance and lateralization of head versus resistance \par XII tongue symmetric, normal strength, no tremor or fasciculation \par Sensory: normal to LT \par Motor: full strength , no tremor. Normal bulk and tone\par Coordination: intact FNF\par Gait : normal balance and gait \par \par \par

## 2023-02-10 NOTE — HISTORY OF PRESENT ILLNESS
[FreeTextEntry1] : Presents to clinic for follow-up with dad. Admits to sometimes missing dose of lamotrigine. No definite convulsive seizures. Did not get EEG. \par Woke up this morning with back hurting. Not sure if he had a seizure.\par He followed up with Dr. Zelaya for chronic thoracic compression fracture. He recommended conservative management with physical therapy an pain management. Raymundo is somewhat reluctant to go to physical therapy because he thought last time, some back manipulation made him worse. He smokes marijuana, drives locally and has cut down drinking to a couple beers a couple times a week (compared to many beers daily). \par Lives with parents. \par \par Current AED: \par zonisamide 100 mg qhs\par lamotrigine 150 mg bid \par _______________________\par 22 \par Last seen in clinic on 22. No interval events concerning for seizure. Admits to missing a couple doses of zonisamide. Cutting down on drinking. Now has less than a six pack a week, still drinks 3-4 times a week. Smokes marijuana. Trying to cut down on cigarettes from 10 to 5. Going to physical therapy. Worsening back pain - it is bad in upper back and under ribs and sternum.  Tried to wear brace and it made things worse. Has not gotten EEG. Drove intermittently to the grocery store. Lives with parents. Not working. Doing yard work and helping around the house. Mood is better. Can tolerate these medications. Going to physical therapy. \par \par Current Medications: \par lamotrigine 150 mg twice a day \par zonisamide 100 mg at night \par ______________________________________\par 22 \par Presenting with father to clinic. Doing well. No interval events concerning for seizure. trying to be mostly compliant with medications. Tolerating them well. Feels less depressed. More hopeful. A lot of social stressors. Dad, who is present in the office today, has treatment refractory depression. \par \par Raymundo is trying to cut down on alcohol, drinks ~1-2 beers, not more a day, most days of the week. Smokes marijuana. Not driving. \par \par Current AEDS. \par zonisamide 100 mg qd \par lamotrigine 150 mg bid \par __________________\par \par Last seen in clinic on 22. Presents to clinic with dad. Did not complete ambulatory EEG and discontinued oxcarbazepine. He states that he could not tolerate it. He is taking lamotrigine 75 mg twice a day now but reports that it makes him almost too level, as if he can't feel anything. He sometimes feels depressed and admits to drinking about 15 beers a week, on average 3-4 beers or more a day.\par \par He unfortunately sustained another seizure in 2022? It occurred out of sleep. He doesn't remember it but experienced severe back pain and had bit both sides of his tongue. He remembers being stressed about a potential love interest and drank more than usual. He also smokes marijuana (not as much edibles) but does this less frequently than drinking. \par \par He has also been driving. Lives with parents. Not working. \par \par Does have history of depression and is feeling depressed now. \par on 22 and imaging revealed a compression fracture of T7 vertebra. This was likely in setting of seizure in January and then worsened by seizure in March. He saw Dr. Zelaya who reviewed imaging and recommended a brace (not wearing today) and consideration of possible kyphoplasty. \par \par ________________________________\par 22 \par Raymundo Carlson is a 31-year-old right-handed man with a past medical history of seizures who is is [facility]. He is not currently taking any medications. \par He previously saw Dr. Lizbeth Harrington, neurologist. \par \par With regard to seizure history, he had an episode in his mid-twenties when he woke up out of sleep. He had bitten his tongue. All the things in his living quarters were in disarray. \par The event was not witnessed. He urinated on himself. 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 He had chronic left shoulder pain which \par felt worse in the morning. He is not sure what happened but saw a neurologist who put him lamotrigine. He tolerated it well. He has had MRIs in the past but is not sure what they showed. He\par had EEG in the past, not sure what it showed. He had sleep studies as well which showed "frequent arousals from REM sleep". \par \par 2 years later, he had another episode where he had rolled out of bed. He hit his head on a heavy weight. He had staples. He bit his tongue and urinated on himself. He had knocked everything over. \par Dr. Harrington increased the dose of lamotrigine. He thought it also helped with his mood. \par \par 2 weeks ago, he had another event. He had been drinking alcohol and using cocaine. He laid down in a cot. His friend had mentioned that he had been walking around \par and speaking in a strange voice, confused. His entire body was sore. He is not sure what happened but now his upper back between his shoulder blades and his lower back hurt as \par well as his right shoulder. \par \par Family history: \par \par \par Family History\par Mother - alive - none \par Father - alive - depression/anxiety \par sister - alive - full sibling- none \par \par Seizure risk factors \par No history of febrile seizure \par umbical cord - , cord around neck\par born 10 days early - no nicu stay \par walking early. 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

## 2023-02-14 LAB
25(OH)D3 SERPL-MCNC: 14.6 NG/ML
ALBUMIN SERPL ELPH-MCNC: 5.1 G/DL
ALP BLD-CCNC: 61 U/L
ALT SERPL-CCNC: 30 U/L
ANION GAP SERPL CALC-SCNC: 9 MMOL/L
AST SERPL-CCNC: 20 U/L
BILIRUB SERPL-MCNC: 0.4 MG/DL
BUN SERPL-MCNC: 8 MG/DL
CALCIUM SERPL-MCNC: 9.7 MG/DL
CHLORIDE SERPL-SCNC: 104 MMOL/L
CO2 SERPL-SCNC: 27 MMOL/L
CREAT SERPL-MCNC: 0.76 MG/DL
EGFR: 121 ML/MIN/1.73M2
GLUCOSE SERPL-MCNC: 87 MG/DL
LAMOTRIGINE SERPL-MCNC: 5.4 UG/ML
POTASSIUM SERPL-SCNC: 4.5 MMOL/L
PROT SERPL-MCNC: 7.3 G/DL
SODIUM SERPL-SCNC: 141 MMOL/L
ZONISAMIDE SERPL-MCNC: <2 UG/ML

## 2023-02-21 ENCOUNTER — FORM ENCOUNTER (OUTPATIENT)
Age: 33
End: 2023-02-21

## 2023-02-21 ENCOUNTER — APPOINTMENT (OUTPATIENT)
Dept: PAIN MANAGEMENT | Facility: CLINIC | Age: 33
End: 2023-02-21
Payer: MEDICAID

## 2023-02-21 VITALS
TEMPERATURE: 98 F | DIASTOLIC BLOOD PRESSURE: 71 MMHG | SYSTOLIC BLOOD PRESSURE: 109 MMHG | BODY MASS INDEX: 21.2 KG/M2 | RESPIRATION RATE: 16 BRPM | HEIGHT: 73 IN | OXYGEN SATURATION: 99 % | WEIGHT: 160 LBS

## 2023-02-21 PROCEDURE — 99204 OFFICE O/P NEW MOD 45 MIN: CPT

## 2023-02-21 RX ORDER — METHOCARBAMOL 500 MG/1
500 TABLET, FILM COATED ORAL
Qty: 25 | Refills: 1 | Status: ACTIVE | COMMUNITY
Start: 2023-02-21 | End: 1900-01-01

## 2023-02-21 NOTE — CONSULT LETTER
[Dear  ___] : Dear  [unfilled], [Consult Letter:] : I had the pleasure of evaluating your patient, [unfilled]. [Please see my note below.] : Please see my note below. [Consult Closing:] : Thank you very much for allowing me to participate in the care of this patient.  If you have any questions, please do not hesitate to contact me. [Sincerely,] : Sincerely, [FreeTextEntry3] : Darrin Michelle DO

## 2023-02-21 NOTE — PHYSICAL EXAM
[de-identified] : General: Well-developed and well-nourished.  No acute distress.\par Psychiatric: Behavior was cooperative  \par Head:  Normocephalic and atraumatic\par Eyes:  Sclera white. Conjunctiva and eyelids pink and moist without discharge.\par Cardiovascular:  Regular\par Respiratory:  Trachea midline. Normal effort.\par No accessory muscle use with respiration\par Abdomen: Non distended, soft and nontender\par Skin:  No rashes, ulcers, or lesions appreciated.\par Neck: There is no pain with extension,     ROM wnl\par Back  There ++ no pain with extension,   ROM limited by pain\par Extremities: No edema \par Musculoskeletal: Moving all extremities freely \par Neuro: CN 2-12 Grossly intact\par Sensation to light touch is intact in all extremities\par Gait: Ambulates with no assistive device.

## 2023-02-21 NOTE — REVIEW OF SYSTEMS
[Back Pain] : back pain [Muscle Pain] : muscle pain [Radiating Pain] : radiating pain [Joint Stiffness] : joint stiffness [Decreased ROM] : decreased range of motion

## 2023-02-21 NOTE — DATA REVIEWED
[FreeTextEntry1] : MRI LUMBAR SPINE; MRI THORACIC SPINE \par \par Order#: MRI [id number]; [id number] \par \par \par \par CLINICAL INFORMATION: Back pain \par \par  TECHNIQUE: Multiplanar, multisequence MRI was performed of the lumbar spine. \par  IV Contrast: NONE \par \par  PRIOR STUDIES: No priors available for comparison. \par \par  FINDINGS: \par \par  LOCALIZER: No additional findings. \par  BONES: There is no fracture or bone marrow edema. There are chronic appearing compression \par deformities of T4, T5, T6 and T7 as well as superior endplate deformities of T2, T3 and T8. \par  ALIGNMENT: The alignment is normal. \par  SACROILIAC JOINTS/SACRUM: There is no sacral fracture. The SI joints are partially visualized but \par are intact. \par  CONUS AND CAUDA EQUINA: The distal cord and conus are normal in signal. Conus terminates at L1. \par  VISUALIZED INTRAPELVIC/INTRA-ABDOMINAL SOFT TISSUES: Normal. \par  PARASPINAL SOFT TISSUES: Normal. \par \par \par  INDIVIDUAL LEVELS: \par \par  THORACIC SPINE: No spinal canal or neuroforaminal stenosis. \par \par  L1-L2: No spinal canal or neuroforaminal stenosis. \par  L2-L3: No spinal canal or neuroforaminal stenosis. \par  L3-L4: No spinal canal or neuroforaminal stenosis. \par  L4-L5: No spinal canal or neuroforaminal stenosis. \par  L5-S1: Broad-based posterior disc bulge and moderate bilateral facet arthropathy result in mild \par bilateral neural foraminal narrowing but no significant central canal narrowing. \par \par \par \par  IMPRESSION: \par \par  No acute fracture or subluxation of the thoracic or lumbar spine. \par \par  Chronic compression deformities of several upper/mid thoracic vertebral bodies. \par \par  Mild bilateral neural foraminal narrowing at L5-S1 secondary to posterior disc bulge and facet \par arthropathy. No additional areas of significant central canal or neural foraminal narrowing within \par the thoracic or lumbar spine.

## 2023-02-21 NOTE — HISTORY OF PRESENT ILLNESS
[Back Pain] : back pain [FreeTextEntry1] : HPI - Mr. NICOLE RUIZ is a 32 year M with PHx seizure d/o as below, referred by Dr Zelaya who presents today with chief complaint of back pain. Reports that it developed 3 months ago It is located lumbar spine ;  there is radiation of the pain into the shoulder blade. The pain is presently 10/10 in severity on the numerical rating scale. It is sharp in nature. The pain is constant. There is diurnal worsening, during the course of the day. The pain is aggravated with standing, walking, climbing stairs The pain improves with rest. The pain is functionally and emotionally disabling for the patient as its preventing them from going about activities of daily living, such as routine housework. The pain does impair the patient’s ability to sleep. \par \par Patient has  NOT been seen by pain management in the past. \par Patient reports 6 weeks of provider directed treatment, including physical therapy\par Patient reports treatment that occurred within the last 3 months \par \par Reports there is no present numbness, there is no weakness. Patient denies any bowel/bladder incontinence, no saddle/perineal anesthesia or any other red flag signs or symptoms. Reports regular BMs.\par

## 2023-02-22 ENCOUNTER — APPOINTMENT (OUTPATIENT)
Dept: NEUROLOGY | Facility: CLINIC | Age: 33
End: 2023-02-22
Payer: MEDICAID

## 2023-02-22 PROCEDURE — 95816 EEG AWAKE AND DROWSY: CPT

## 2023-02-23 ENCOUNTER — APPOINTMENT (OUTPATIENT)
Dept: NEUROLOGY | Facility: CLINIC | Age: 33
End: 2023-02-23

## 2023-02-23 PROCEDURE — 95708 EEG WO VID EA 12-26HR UNMNTR: CPT

## 2023-02-23 PROCEDURE — 95700 EEG CONT REC W/VID EEG TECH: CPT

## 2023-02-23 PROCEDURE — 95719 EEG PHYS/QHP EA INCR W/O VID: CPT

## 2023-03-28 ENCOUNTER — APPOINTMENT (OUTPATIENT)
Dept: FAMILY MEDICINE | Facility: CLINIC | Age: 33
End: 2023-03-28
Payer: MEDICAID

## 2023-03-28 VITALS
OXYGEN SATURATION: 98 % | BODY MASS INDEX: 21.2 KG/M2 | DIASTOLIC BLOOD PRESSURE: 70 MMHG | SYSTOLIC BLOOD PRESSURE: 110 MMHG | WEIGHT: 160 LBS | HEIGHT: 73 IN | TEMPERATURE: 98 F | HEART RATE: 97 BPM

## 2023-03-28 DIAGNOSIS — M79.10 MYALGIA, UNSPECIFIED SITE: ICD-10-CM

## 2023-03-28 DIAGNOSIS — Z11.3 ENCOUNTER FOR SCREENING FOR INFECTIONS WITH A PREDOMINANTLY SEXUAL MODE OF TRANSMISSION: ICD-10-CM

## 2023-03-28 DIAGNOSIS — M54.14 RADICULOPATHY, THORACIC REGION: ICD-10-CM

## 2023-03-28 DIAGNOSIS — S22.060A WEDGE COMPRESSION FRACTURE OF T7-T8 VERTEBRA, INITIAL ENCOUNTER FOR CLOSED FRACTURE: ICD-10-CM

## 2023-03-28 PROCEDURE — G0444 DEPRESSION SCREEN ANNUAL: CPT | Mod: 59

## 2023-03-28 PROCEDURE — 99385 PREV VISIT NEW AGE 18-39: CPT | Mod: 25

## 2023-03-31 ENCOUNTER — RX RENEWAL (OUTPATIENT)
Age: 33
End: 2023-03-31

## 2023-03-31 LAB
ALBUMIN SERPL ELPH-MCNC: 5.2 G/DL
ALP BLD-CCNC: 60 U/L
ALT SERPL-CCNC: 21 U/L
ANION GAP SERPL CALC-SCNC: 16 MMOL/L
APPEARANCE: CLEAR
AST SERPL-CCNC: 23 U/L
BACTERIA UR CULT: NORMAL
BACTERIA: NEGATIVE
BASOPHILS # BLD AUTO: 0.11 K/UL
BASOPHILS NFR BLD AUTO: 1.4 %
BILIRUB SERPL-MCNC: 0.4 MG/DL
BILIRUBIN URINE: NEGATIVE
BLOOD URINE: NEGATIVE
BUN SERPL-MCNC: 8 MG/DL
C TRACH RRNA SPEC QL NAA+PROBE: NOT DETECTED
CALCIUM SERPL-MCNC: 10 MG/DL
CHLORIDE SERPL-SCNC: 104 MMOL/L
CHOLEST SERPL-MCNC: 188 MG/DL
CO2 SERPL-SCNC: 20 MMOL/L
COLOR: YELLOW
CREAT SERPL-MCNC: 0.87 MG/DL
EGFR: 116 ML/MIN/1.73M2
EOSINOPHIL # BLD AUTO: 0.15 K/UL
EOSINOPHIL NFR BLD AUTO: 2 %
ESTIMATED AVERAGE GLUCOSE: 91 MG/DL
GLUCOSE QUALITATIVE U: NEGATIVE
GLUCOSE SERPL-MCNC: 93 MG/DL
HAV IGM SER QL: NONREACTIVE
HBA1C MFR BLD HPLC: 4.8 %
HBV SURFACE AB SER QL: NONREACTIVE
HBV SURFACE AG SER QL: NONREACTIVE
HCT VFR BLD CALC: 47.7 %
HCV AB SER QL: NONREACTIVE
HCV S/CO RATIO: 0.12 S/CO
HDLC SERPL-MCNC: 64 MG/DL
HEPATITIS A IGG ANTIBODY: REACTIVE
HGB BLD-MCNC: 16.2 G/DL
HIV1+2 AB SPEC QL IA.RAPID: NONREACTIVE
HYALINE CASTS: 0 /LPF
IMM GRANULOCYTES NFR BLD AUTO: 0.4 %
KETONES URINE: NEGATIVE
LDLC SERPL CALC-MCNC: 109 MG/DL
LEUKOCYTE ESTERASE URINE: NEGATIVE
LYMPHOCYTES # BLD AUTO: 2.52 K/UL
LYMPHOCYTES NFR BLD AUTO: 32.9 %
MAN DIFF?: NORMAL
MCHC RBC-ENTMCNC: 29.6 PG
MCHC RBC-ENTMCNC: 34 GM/DL
MCV RBC AUTO: 87.2 FL
MICROSCOPIC-UA: NORMAL
MONOCYTES # BLD AUTO: 0.37 K/UL
MONOCYTES NFR BLD AUTO: 4.8 %
N GONORRHOEA RRNA SPEC QL NAA+PROBE: NOT DETECTED
NEUTROPHILS # BLD AUTO: 4.48 K/UL
NEUTROPHILS NFR BLD AUTO: 58.5 %
NITRITE URINE: NEGATIVE
NONHDLC SERPL-MCNC: 124 MG/DL
PH URINE: 6
PLATELET # BLD AUTO: 355 K/UL
POTASSIUM SERPL-SCNC: 3.9 MMOL/L
PROT SERPL-MCNC: 7.5 G/DL
PROTEIN URINE: NEGATIVE
RBC # BLD: 5.47 M/UL
RBC # FLD: 12.8 %
RED BLOOD CELLS URINE: 1 /HPF
SODIUM SERPL-SCNC: 140 MMOL/L
SOURCE AMPLIFICATION: NORMAL
SPECIFIC GRAVITY URINE: 1.02
SQUAMOUS EPITHELIAL CELLS: 0 /HPF
T4 FREE SERPL-MCNC: 1.3 NG/DL
TRIGL SERPL-MCNC: 73 MG/DL
TSH SERPL-ACNC: 2.45 UIU/ML
UROBILINOGEN URINE: NORMAL
WBC # FLD AUTO: 7.66 K/UL
WHITE BLOOD CELLS URINE: 1 /HPF

## 2023-03-31 RX ORDER — CHROMIUM 200 MCG
25 MCG TABLET ORAL
Qty: 90 | Refills: 0 | Status: DISCONTINUED | COMMUNITY
Start: 2022-09-16 | End: 2023-03-31

## 2023-04-04 ENCOUNTER — APPOINTMENT (OUTPATIENT)
Dept: PAIN MANAGEMENT | Facility: CLINIC | Age: 33
End: 2023-04-04

## 2023-04-05 PROBLEM — M54.14 THORACIC RADICULITIS: Status: ACTIVE | Noted: 2023-02-21

## 2023-04-05 PROBLEM — Z11.3 SCREENING EXAMINATION FOR STD (SEXUALLY TRANSMITTED DISEASE): Status: ACTIVE | Noted: 2023-03-28

## 2023-04-05 PROBLEM — M79.10 MYALGIA: Status: ACTIVE | Noted: 2023-02-21

## 2023-04-05 PROBLEM — S22.060A COMPRESSION FRACTURE OF T7 VERTEBRA: Status: ACTIVE | Noted: 2022-05-04

## 2023-04-19 NOTE — HEALTH RISK ASSESSMENT
[Good] : ~his/her~  mood as  good [Yes] : Yes [Never (0 pts)] : Never (0 points) [1 or 2 (0 pts)] : 1 or 2 (0 points) [0] : 2) Feeling down, depressed, or hopeless: Not at all (0) [PHQ-2 Negative - No further assessment needed] : PHQ-2 Negative - No further assessment needed [With Family] : lives with family [# of Members in Household ___] :  household currently consist of [unfilled] member(s) [Unemployed] : unemployed [Single] : single [Fully functional (bathing, dressing, toileting, transferring, walking, feeding)] : Fully functional (bathing, dressing, toileting, transferring, walking, feeding) [Fully functional (using the telephone, shopping, preparing meals, housekeeping, doing laundry, using] : Fully functional and needs no help or supervision to perform IADLs (using the telephone, shopping, preparing meals, housekeeping, doing laundry, using transportation, managing medications and managing finances) [Current] : Current [No falls in past year] : Patient reported no falls in the past year [Patient declined mammogram] : Patient declined mammogram [Patient declined PAP Smear] : Patient declined PAP Smear [Patient declined bone density test] : Patient declined bone density test [Patient declined colonoscopy] : Patient declined colonoscopy [HIV Test offered] : HIV Test offered [Hepatitis C test offered] : Hepatitis C test offered [FreeTextEntry1] : None [de-identified] : Dancing, hiking, [[id number]] : 0 [Reports changes in hearing] : Reports no changes in hearing [Reports changes in vision] : Reports no changes in vision [Reports changes in dental health] : Reports no changes in dental health [de-identified] : Smoking for 13 years; 1 pack for 2 days

## 2023-05-12 ENCOUNTER — APPOINTMENT (OUTPATIENT)
Dept: NEUROLOGY | Facility: CLINIC | Age: 33
End: 2023-05-12

## 2023-09-27 RX ORDER — LAMOTRIGINE 150 MG/1
150 TABLET ORAL
Refills: 0 | Status: ACTIVE | COMMUNITY

## 2023-09-27 RX ORDER — LAMOTRIGINE 150 MG/1
150 TABLET ORAL TWICE DAILY
Qty: 180 | Refills: 3 | Status: ACTIVE | COMMUNITY
Start: 2023-02-10 | End: 1900-01-01

## 2024-01-01 NOTE — ASSESSMENT
[FreeTextEntry1] : Mr. NICOLE RUIZ is a 32 year M suffering from thoracic pain, that based upon today's subjective complaints, physical examination, and MRI review, is likely multifactorial with component of myalgia (rhomboid strain) in conjunciton with element of thoracic radiculopathy\par \par >> Medications\par \par Chronic opioid use for non-malignant pain, in particular at high doses would not be recommended since it can potentially lead to hyperalgesia (hypersensitivity), tolerance and addiction. \par \par Using lamictal and so will hold off on adding other anticonvulsants\par  \par Avoid steroids in setting of what is likely osteopenia/ osteoporosis\par  \par >> Interventions\par  \par Will consider thoracic mbb/ rfa if fails to respond to conservative measures\par  \par >> Therapy and Other Modalities\par  \par PT 2x/week for 4 weeks for thoracic back pain, focusing on GENTLE  spinal rehab, GENTLE   strengthening of core, rhomboids, levator scapula, scapular mobility, general aerobic conditioning, GENTLE MASSAGE and MYOFASCIAL  Modalaties as needed (US, Heat , trial of TENS unit, ROM, stretching hamstrings).\par Precautions: Osteoporosis Falls, universal safety precautions \par \par >> Imaging and Other Studies\par \par I have personally reviewed the images in detail together with the patient today, and I have answered all questions regarding this condition to the best of my ability, to the patient's satisfaction. \par \par >> Consults\par \par Pain psychology, coping with chronic pain (h/o etoh overuse) yes

## 2024-01-16 ENCOUNTER — APPOINTMENT (OUTPATIENT)
Dept: NEUROLOGY | Facility: CLINIC | Age: 34
End: 2024-01-16

## 2024-03-29 ENCOUNTER — APPOINTMENT (OUTPATIENT)
Dept: FAMILY MEDICINE | Facility: CLINIC | Age: 34
End: 2024-03-29
Payer: MEDICAID

## 2024-03-29 VITALS
SYSTOLIC BLOOD PRESSURE: 110 MMHG | TEMPERATURE: 98 F | HEART RATE: 97 BPM | OXYGEN SATURATION: 98 % | WEIGHT: 160 LBS | HEIGHT: 73 IN | BODY MASS INDEX: 21.2 KG/M2 | DIASTOLIC BLOOD PRESSURE: 70 MMHG

## 2024-03-29 PROCEDURE — 99214 OFFICE O/P EST MOD 30 MIN: CPT

## 2024-03-29 PROCEDURE — G2211 COMPLEX E/M VISIT ADD ON: CPT | Mod: NC,1L

## 2024-03-31 NOTE — HISTORY OF PRESENT ILLNESS
[FreeTextEntry8] : 32 yo M here for discussion recent diagnosis of TMJ and unclear etiology of right sided facial pressure/numbness following prolonged dental procedure. Patient seen by ENT and diagnosed with TMJ disorder and had been given exercises to do, has reported some improvement in symptoms but told to follow up with PCP. Possible diagnosis discussed with ENT was possible trigeminal neuralgia. Has appt with neurology coming up. Also continues to report clicking sensation in jaw/ear area. Patient had seen Dr. Mackey, ENT.

## 2024-03-31 NOTE — PLAN
[FreeTextEntry1] : Given clinical symptoms and history, likely natural course of TMJ Reviewed exercises Will follow up with neurology given partial numbness/pressure of face, unclear etiology All questions answered

## 2024-04-11 ENCOUNTER — APPOINTMENT (OUTPATIENT)
Dept: NEUROLOGY | Facility: CLINIC | Age: 34
End: 2024-04-11
Payer: MEDICAID

## 2024-04-11 VITALS
BODY MASS INDEX: 21.2 KG/M2 | HEIGHT: 73 IN | WEIGHT: 160 LBS | DIASTOLIC BLOOD PRESSURE: 78 MMHG | HEART RATE: 80 BPM | OXYGEN SATURATION: 99 % | SYSTOLIC BLOOD PRESSURE: 112 MMHG

## 2024-04-11 PROCEDURE — 99215 OFFICE O/P EST HI 40 MIN: CPT

## 2024-04-24 LAB
25(OH)D3 SERPL-MCNC: 26.3 NG/ML
ALBUMIN SERPL ELPH-MCNC: 5 G/DL
ALP BLD-CCNC: 61 U/L
ALT SERPL-CCNC: 21 U/L
ANION GAP SERPL CALC-SCNC: 10 MMOL/L
AST SERPL-CCNC: 21 U/L
BILIRUB SERPL-MCNC: 0.4 MG/DL
BUN SERPL-MCNC: 9 MG/DL
CALCIUM SERPL-MCNC: 9.5 MG/DL
CHLORIDE SERPL-SCNC: 106 MMOL/L
CO2 SERPL-SCNC: 25 MMOL/L
CREAT SERPL-MCNC: 0.81 MG/DL
EGFR: 118 ML/MIN/1.73M2
GLUCOSE SERPL-MCNC: 82 MG/DL
HCT VFR BLD CALC: 47.3 %
HGB BLD-MCNC: 15.5 G/DL
LAMOTRIGINE SERPL-MCNC: 5.8 UG/ML
MCHC RBC-ENTMCNC: 29.1 PG
MCHC RBC-ENTMCNC: 32.8 GM/DL
MCV RBC AUTO: 88.7 FL
PLATELET # BLD AUTO: 342 K/UL
POTASSIUM SERPL-SCNC: 5.1 MMOL/L
PROT SERPL-MCNC: 6.8 G/DL
RBC # BLD: 5.33 M/UL
RBC # FLD: 12.8 %
SODIUM SERPL-SCNC: 141 MMOL/L
VIT B12 SERPL-MCNC: 606 PG/ML
WBC # FLD AUTO: 6.84 K/UL
ZONISAMIDE SERPL-MCNC: <2 UG/ML

## 2024-04-24 RX ORDER — ZONISAMIDE 100 MG/1
100 CAPSULE ORAL
Qty: 90 | Refills: 3 | Status: ACTIVE | COMMUNITY
Start: 2022-07-07 | End: 1900-01-01

## 2024-04-24 RX ORDER — MULTIVIT-MIN/FOLIC/VIT K/LYCOP 400-300MCG
25 MCG TABLET ORAL
Qty: 90 | Refills: 1 | Status: ACTIVE | COMMUNITY
Start: 2023-03-31 | End: 1900-01-01

## 2024-04-25 ENCOUNTER — APPOINTMENT (OUTPATIENT)
Dept: FAMILY MEDICINE | Facility: CLINIC | Age: 34
End: 2024-04-25
Payer: MEDICAID

## 2024-04-25 ENCOUNTER — LABORATORY RESULT (OUTPATIENT)
Age: 34
End: 2024-04-25

## 2024-04-25 VITALS
SYSTOLIC BLOOD PRESSURE: 110 MMHG | HEIGHT: 73 IN | DIASTOLIC BLOOD PRESSURE: 70 MMHG | HEART RATE: 80 BPM | BODY MASS INDEX: 20.15 KG/M2 | OXYGEN SATURATION: 98 % | WEIGHT: 152 LBS | TEMPERATURE: 98 F

## 2024-04-25 DIAGNOSIS — R20.2 ANESTHESIA OF SKIN: ICD-10-CM

## 2024-04-25 DIAGNOSIS — R20.0 ANESTHESIA OF SKIN: ICD-10-CM

## 2024-04-25 DIAGNOSIS — M26.609 UNSPECIFIED TEMPOROMANDIBULAR JOINT DISORDER: ICD-10-CM

## 2024-04-25 DIAGNOSIS — E53.8 DEFICIENCY OF OTHER SPECIFIED B GROUP VITAMINS: ICD-10-CM

## 2024-04-25 DIAGNOSIS — R53.83 OTHER FATIGUE: ICD-10-CM

## 2024-04-25 DIAGNOSIS — F10.10 ALCOHOL ABUSE, UNCOMPLICATED: ICD-10-CM

## 2024-04-25 DIAGNOSIS — E55.9 VITAMIN D DEFICIENCY, UNSPECIFIED: ICD-10-CM

## 2024-04-25 DIAGNOSIS — Z00.00 ENCOUNTER FOR GENERAL ADULT MEDICAL EXAMINATION W/OUT ABNORMAL FINDINGS: ICD-10-CM

## 2024-04-25 PROCEDURE — 99395 PREV VISIT EST AGE 18-39: CPT | Mod: 25

## 2024-04-25 PROCEDURE — 99213 OFFICE O/P EST LOW 20 MIN: CPT | Mod: 25

## 2024-04-28 PROBLEM — E55.9 VITAMIN D INSUFFICIENCY: Status: ACTIVE | Noted: 2022-05-18

## 2024-04-28 PROBLEM — R20.0 NUMBNESS AND TINGLING OF RIGHT SIDE OF FACE: Status: ACTIVE | Noted: 2024-04-11

## 2024-04-28 PROBLEM — R53.83 FATIGUE: Status: ACTIVE | Noted: 2024-04-25

## 2024-04-28 PROBLEM — E53.8 DEFICIENCY OF VITAMIN B12: Status: ACTIVE | Noted: 2024-04-11

## 2024-04-28 PROBLEM — Z00.00 ENCOUNTER FOR PREVENTIVE HEALTH EXAMINATION: Status: ACTIVE | Noted: 2022-01-31

## 2024-04-28 PROBLEM — M26.609 TMJ (TEMPOROMANDIBULAR JOINT DISORDER): Status: ACTIVE | Noted: 2024-03-29

## 2024-04-28 PROBLEM — F10.10 ALCOHOL ABUSE: Status: ACTIVE | Noted: 2022-05-18

## 2024-04-28 LAB
ALBUMIN SERPL ELPH-MCNC: 4.9 G/DL
ALP BLD-CCNC: 65 U/L
ALT SERPL-CCNC: 21 U/L
ANION GAP SERPL CALC-SCNC: 14 MMOL/L
APPEARANCE: CLEAR
AST SERPL-CCNC: 21 U/L
BASOPHILS # BLD AUTO: 0.13 K/UL
BASOPHILS NFR BLD AUTO: 1.7 %
BILIRUB SERPL-MCNC: 0.4 MG/DL
BILIRUBIN URINE: NEGATIVE
BLOOD URINE: NEGATIVE
BUN SERPL-MCNC: 12 MG/DL
CALCIUM SERPL-MCNC: 9.5 MG/DL
CHLORIDE SERPL-SCNC: 105 MMOL/L
CHOLEST SERPL-MCNC: 160 MG/DL
CO2 SERPL-SCNC: 22 MMOL/L
COLOR: YELLOW
CREAT SERPL-MCNC: 0.84 MG/DL
EGFR: 117 ML/MIN/1.73M2
EOSINOPHIL # BLD AUTO: 0.39 K/UL
EOSINOPHIL NFR BLD AUTO: 5 %
ESTIMATED AVERAGE GLUCOSE: 97 MG/DL
FERRITIN SERPL-MCNC: 86 NG/ML
GLUCOSE QUALITATIVE U: NEGATIVE MG/DL
GLUCOSE SERPL-MCNC: 130 MG/DL
HBA1C MFR BLD HPLC: 5 %
HCT VFR BLD CALC: 45.3 %
HDLC SERPL-MCNC: 69 MG/DL
HGB BLD-MCNC: 15.1 G/DL
IMM GRANULOCYTES NFR BLD AUTO: 0.4 %
IRON SATN MFR SERPL: 24 %
IRON SERPL-MCNC: 80 UG/DL
KETONES URINE: NEGATIVE MG/DL
LDLC SERPL CALC-MCNC: 67 MG/DL
LEUKOCYTE ESTERASE URINE: NEGATIVE
LYMPHOCYTES # BLD AUTO: 2.81 K/UL
LYMPHOCYTES NFR BLD AUTO: 35.9 %
MAN DIFF?: NORMAL
MCHC RBC-ENTMCNC: 29.5 PG
MCHC RBC-ENTMCNC: 33.3 GM/DL
MCV RBC AUTO: 88.5 FL
MONOCYTES # BLD AUTO: 0.54 K/UL
MONOCYTES NFR BLD AUTO: 6.9 %
NEUTROPHILS # BLD AUTO: 3.93 K/UL
NEUTROPHILS NFR BLD AUTO: 50.1 %
NITRITE URINE: NEGATIVE
NONHDLC SERPL-MCNC: 91 MG/DL
PH URINE: 6.5
PLATELET # BLD AUTO: 312 K/UL
POTASSIUM SERPL-SCNC: 4 MMOL/L
PROT SERPL-MCNC: 7.2 G/DL
PROTEIN URINE: NEGATIVE MG/DL
RBC # BLD: 5.12 M/UL
RBC # FLD: 12.7 %
SODIUM SERPL-SCNC: 141 MMOL/L
SPECIFIC GRAVITY URINE: 1.02
TIBC SERPL-MCNC: 330 UG/DL
TRIGL SERPL-MCNC: 139 MG/DL
TSH SERPL-ACNC: 1.62 UIU/ML
UIBC SERPL-MCNC: 250 UG/DL
UROBILINOGEN URINE: 0.2 MG/DL
WBC # FLD AUTO: 7.83 K/UL

## 2024-04-28 NOTE — HISTORY OF PRESENT ILLNESS
[FreeTextEntry1] : Annual [de-identified] : 34 yo M with hx of seizures, hx of significant alcohol abuse, presenting for routine annual physical. Since last visit, patient evaluated by neurology for vague right sided numbness/tingling that started after having a dental procedure. Symptoms are resolving and neurology note reviewed. Patient denies any headaches, chest pain, shortness of breath, nausea/vomiting, diarrhea, constipation, changes in vision. Lives at home with parents. Reports some fatigue, concerned for Lyme due to exposure, but has not noticed any tick bites.

## 2024-04-28 NOTE — PLAN
[FreeTextEntry1] : 34 yo M with hx of seizures, hx of significant alcohol abuse, presenting for routine annual physical. Seizures - follows with neurology, on medications, stable, reviewed note/chart Fatigue - labs drawn in office, Lyme will be checked, unclear hx of Lyme exposure Exam reassuring Labs drawn in office All questions answered Age related guidance reviewed; smoking cessation encouraged, alcohol limitation discussed

## 2024-04-28 NOTE — HEALTH RISK ASSESSMENT
[Yes] : Yes [Monthly or less (1 pt)] : Monthly or less (1 point) [1 or 2 (0 pts)] : 1 or 2 (0 points) [Never (0 pts)] : Never (0 points) [No falls in past year] : Patient reported no falls in the past year [0] : 2) Feeling down, depressed, or hopeless: Not at all (0) [Fully functional (bathing, dressing, toileting, transferring, walking, feeding)] : Fully functional (bathing, dressing, toileting, transferring, walking, feeding) [Fully functional (using the telephone, shopping, preparing meals, housekeeping, doing laundry, using] : Fully functional and needs no help or supervision to perform IADLs (using the telephone, shopping, preparing meals, housekeeping, doing laundry, using transportation, managing medications and managing finances) [Current] : Current [Good] : ~his/her~  mood as  good [2 - 4 times a month (2 pts)] : 2-4 times a month (2 points) [PHQ-2 Negative - No further assessment needed] : PHQ-2 Negative - No further assessment needed [Audit-CScore] : 2 [de-identified] : walking [de-identified] : regular [[id number]] : 0 [None] : None [With Family] : lives with family [Unemployed] : unemployed [Single] : single [Feels Safe at Home] : Feels safe at home [Reports changes in hearing] : Reports no changes in hearing [Reports changes in vision] : Reports no changes in vision [5-9] : 5-9

## 2024-05-03 DIAGNOSIS — A69.20 LYME DISEASE, UNSPECIFIED: ICD-10-CM

## 2024-05-03 RX ORDER — DOXYCYCLINE HYCLATE 100 MG/1
100 TABLET ORAL TWICE DAILY
Qty: 28 | Refills: 0 | Status: ACTIVE | COMMUNITY
Start: 2024-05-03 | End: 1900-01-01

## 2024-05-20 NOTE — DISCUSSION/SUMMARY
[FreeTextEntry1] : Raymundo is a pleasant 32-year-old man with past medical history of alcohol abuse (has cut down significantly), current smoker with at least 3 clinical events concerning for nocturnal seizures. Unclear if precipitated by alcohol use but the fact that he has 3 events, and the fact that they are nocturnal increases the likelihood that he carries a diagnosis of epilepsy. Suspect frontal lobe seizures however EEG 2/2023 was normal (a normal EEG does not rule out epilepsy).   As per dad, used to see Dr. Nickolas Helton MD at [facility] who was not sure if he had a sleep disorder or seizures??   Discussed importance of limiting alcohol use and the high risk of serious injury with seizures including but not limited to brain bleed, shoulder dislocation, death. Nocturnal convulsive seizures are associated with SUDEP. Discussed this in detail on 2/10/23  Started lamotrigine and oxcarbazepine. He self-discontinued oxcarbazepine. He was drinking 15 beers a week and sustained another seizure in March 2022 , also in setting of alcohol use. He was found to have a T7 compression fracture. Seen by Dr. Zelaya, recommendation for pain management and physical therapy..  Doing well now on zonisamide 100 mg daily and lamotrigine 150 mg twice a day. Will continue. 2/2023  Ambulatory EEG normal.   Had recent dental extraction with numbness in right V3 region (initially, involved entire right side of head and back of head). Not a stabbing, electric like pain, just numbness. Possible related to inferior alveolar nerve irritation during dental procedure as it happened  just after it. It is getting better which is reassuring - can prescribe gabapentin. Will get mri brain w/wo contrast just incase to look for trigeminal pathology such as trigeminal neuralgia, brain stem mass. Also, he has epilepsy and has had no MRIs-  MRI of brain to look for focal lesion associated with epilepsy is needed.

## 2024-05-20 NOTE — ASSESSMENT
[FreeTextEntry1] : Please get labwork Please take lamotrigine 150 mg every 12 hours  Please take zonisamide 100 mg at night  Please take vitamin d 1000 units daily Please drink 8 glasses of water a night Please try magnesium glycinate 400 mg at night (can order this on amazon) - helps with insomnia, anxiety   I will prescribe gabapentin 300 mg - take every eight hours as needed for pain - if it makes you too tired can take it at night Please get MRI brain with and without contrast - to assess right V3 facial numbness which is persistent and to assess for focal lesion in setting of epilepsy.    Return to clinic in 4-6 months to see Dr. Radha Strickland

## 2024-05-20 NOTE — DATA REVIEWED
[de-identified] : 2/1/23: no acute fracture or subluxation of the thoracic or lumbar spine. Chronic compression \par  deformities of several upper/mid thoracic vertebral bodies. Mild bilateral foraminal narrowing \par  at L5/S1 secondary to posterior disc bulge and facet arthropathy. No additional areas of significant \par  central canal or neural foraminal narrowing within the thoracic or lumbar spine.  [de-identified] : MRI thoracic and lumbar spine 1/31/23: no acute fracture or subluxation of the thoracic or lumbar spine. Chronic compression  deformities of several upper/mid thoracic vertebral bodies. Mild bilateral foraminal narrowing  at L5/S1 secondary to posterior disc bulge and facet arthropathy. No additional areas of significant  central canal or neural foraminal narrowing within the thoracic or lumbar spine.   Labs: 9/2022 - lamotrigine 5.1 ; zonisamide <2   ; vitamin D 27.9 low  4/29/22: MRI thoracic spine: subacute mild compression deformity at T7 with multiple additional compression deformities in  upper to midthoracic spine.  6/29/21  Na 140  creatinine 0.9

## 2024-05-20 NOTE — CONSULT LETTER
[Dear  ___] : Dear  [unfilled], [Consult Letter:] : I had the pleasure of evaluating your patient, [unfilled]. [Please see my note below.] : Please see my note below. [Sincerely,] : Sincerely, [FreeTextEntry3] : Radha Strickland MD [facility]

## 2024-05-20 NOTE — HISTORY OF PRESENT ILLNESS
[FreeTextEntry1] : Last seen in clinic on 2/10/23. Doing well. Taking lamotrigine 150 mg every 12 hours. Also taking zonisamide.  No seizures. Not waking up with tongue bite. Drinks alcohol 2-3 times a week but has cut down. Lives with parents. Looking for a job. One month ago had a tooth extraction on the right. Thinks that his mouth was opened too wide for too long. Had some numbness in the right V3 region. Initially, after the procedure, he felt the entire right side of the face and back of the head felt numb. Thought it was just a numbing medication that was wearing off. Afterwards, he felt sore in his right eye, his nose felt stuffy. The back of his head on the right started to feel numb, uncomfortable. Then the entire right side of his face. It is improving now. Just a numbness in the right V3 region. No stabbing pain in this region.   Sometimes misses seizure medications. Discussed importance of compliance.   Medications include:  lamotrigine 150 mg daily  methocarbamol 500 mg (1 tablet bid)  vitamin b complex vitamin d3  zonisamide 100 mg qhs   ____________________  Presents to clinic for follow-up with dad. Admits to sometimes missing dose of lamotrigine. No definite convulsive seizures. Did not get EEG.  Woke up this morning with back hurting. Not sure if he had a seizure. He followed up with Dr. Zelaya for chronic thoracic compression fracture. He recommended conservative management with physical therapy an pain management. Raymundo is somewhat reluctant to go to physical therapy because he thought last time, some back manipulation made him worse. He smokes marijuana, drives locally and has cut down drinking to a couple beers a couple times a week (compared to many beers daily).  Lives with parents.  _______________________ 22  Last seen in clinic on 22. No interval events concerning for seizure. Admits to missing a couple doses of zonisamide. Cutting down on drinking. Now has less than a six pack a week, still drinks 3-4 times a week. Smokes marijuana. Trying to cut down on cigarettes from 10 to 5. Going to physical therapy. Worsening back pain - it is bad in upper back and under ribs and sternum.  Tried to wear brace and it made things worse. Has not gotten EEG. Drove intermittently to the grocery store. Lives with parents. Not working. Doing yard work and helping around the house. Mood is better. Can tolerate these medications. Going to physical therapy.   Current Medications:  lamotrigine 150 mg twice a day  zonisamide 100 mg at night  ______________________________________ 22  Presenting with father to clinic. Doing well. No interval events concerning for seizure. trying to be mostly compliant with medications. Tolerating them well. Feels less depressed. More hopeful. A lot of social stressors. Dad, who is present in the office today, has treatment refractory depression.   Raymundo is trying to cut down on alcohol, drinks ~1-2 beers, not more a day, most days of the week. Smokes marijuana. Not driving.   Current AEDS.  zonisamide 100 mg qd  lamotrigine 150 mg bid  __________________  Last seen in clinic on 22. Presents to clinic with dad. Did not complete ambulatory EEG and discontinued oxcarbazepine. He states that he could not tolerate it. He is taking lamotrigine 75 mg twice a day now but reports that it makes him almost too level, as if he can't feel anything. He sometimes feels depressed and admits to drinking about 15 beers a week, on average 3-4 beers or more a day.  He unfortunately sustained another seizure in 2022? It occurred out of sleep. He doesn't remember it but experienced severe back pain and had bit both sides of his tongue. He remembers being stressed about a potential love interest and drank more than usual. He also smokes marijuana (not as much edibles) but does this less frequently than drinking.   He has also been driving. Lives with parents. Not working.   Does have history of depression and is feeling depressed now.  on 22 and imaging revealed a compression fracture of T7 vertebra. This was likely in setting of seizure in January and then worsened by seizure in March. He saw Dr. Zelaya who reviewed imaging and recommended a brace (not wearing today) and consideration of possible kyphoplasty.   ________________________________ 22  Raymundo Carlson is a 31-year-old right-handed man with a past medical history of seizures who is is presenting to [facility] care. He is not currently taking any medications.  He previously saw Dr. Lizbeth Harrington, neurologist.   With regard to seizure history, he had an episode in his mid-twenties when he woke up out of sleep. He had bitten his tongue. All the things in his living quarters were in disarray.  The event was not witnessed. He urinated on himself. 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 He had chronic left shoulder pain which  felt worse in the morning. He is not sure what happened but saw a neurologist who put him lamotrigine. He tolerated it well. He has had MRIs in the past but is not sure what they showed. He had EEG in the past, not sure what it showed. He had sleep studies as well which showed "frequent arousals from REM sleep".   2 years later, he had another episode where he had rolled out of bed. He hit his head on a heavy weight. He had staples. He bit his tongue and urinated on himself. He had knocked everything over.  Dr. Harrington increased the dose of lamotrigine. He thought it also helped with his mood.   2 weeks ago, he had another event. He had been drinking alcohol and using cocaine. He laid down in a cot. His friend had mentioned that he had been walking around  and speaking in a strange voice, confused. His entire body was sore. He is not sure what happened but now his upper back between his shoulder blades and his lower back hurt as  well as his right shoulder.   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

## 2024-05-20 NOTE — PHYSICAL EXAM
[FreeTextEntry1] : Mental Status:alert, oriented. Speech fluent. Euthymic affect. CN: visual acuity, VFF, blink to confrontation  III, IV, VI, PERRL, EOMI  V diminished sensation to pinprick in right V3  VII normal squint vs resistance, normal smile, face symmetric  VIII: normal hearing  IX, X normal gag, symmetric palate, uvula raises midline  XI normal shrug versus resistance and lateralization of head versus resistance  XII tongue symmetric, normal strength, no tremor or fasciculation  Sensory: normal to LT  Motor: full strength , no tremor. Normal bulk and tone Coordination: intact FNF Gait : normal balance and gait

## 2024-06-05 ENCOUNTER — RESULT REVIEW (OUTPATIENT)
Age: 34
End: 2024-06-05

## 2024-06-12 ENCOUNTER — APPOINTMENT (OUTPATIENT)
Dept: NEUROLOGY | Facility: CLINIC | Age: 34
End: 2024-06-12
Payer: MEDICAID

## 2024-06-12 VITALS
OXYGEN SATURATION: 98 % | DIASTOLIC BLOOD PRESSURE: 77 MMHG | SYSTOLIC BLOOD PRESSURE: 127 MMHG | HEIGHT: 73 IN | HEART RATE: 80 BPM

## 2024-06-12 DIAGNOSIS — R41.840 ATTENTION AND CONCENTRATION DEFICIT: ICD-10-CM

## 2024-06-12 DIAGNOSIS — Z87.898 PERSONAL HISTORY OF OTHER SPECIFIED CONDITIONS: ICD-10-CM

## 2024-06-12 PROCEDURE — 99214 OFFICE O/P EST MOD 30 MIN: CPT

## 2024-06-12 RX ORDER — GABAPENTIN 300 MG/1
300 CAPSULE ORAL EVERY 8 HOURS
Qty: 270 | Refills: 0 | Status: DISCONTINUED | COMMUNITY
Start: 2024-04-24 | End: 2024-06-12

## 2024-06-12 NOTE — DISCUSSION/SUMMARY
[FreeTextEntry1] : Raymundo is a pleasant 33-year-old man with past medical history of alcohol abuse (has cut down significantly), current smoker with at least 3 clinical events concerning for nocturnal seizures. Unclear if precipitated by alcohol use but the fact that he has 3 events, and the fact that they are nocturnal increases the likelihood that he carries a diagnosis of epilepsy. Suspect frontal lobe seizures however EEG 2/2023 was normal (a normal EEG does not rule out epilepsy).  As per dad, used to see Dr. Nickolas Helton MD at [facility] who was not sure if he had a sleep disorder or seizures??   Discussed importance of limiting alcohol use and the high risk of serious injury with seizures including but not limited to brain bleed, shoulder dislocation, death. Nocturnal convulsive seizures are associated with SUDEP. Discussed this in detail on 2/10/23  Started lamotrigine and oxcarbazepine. He self-discontinued oxcarbazepine. He was drinking 15 beers a week and sustained another seizure in March 2022 , also in setting of alcohol use. He was found to have a T7 compression fracture. Seen by Dr. Zealya, recommendation for pain management and physical therapy..  Doing well now on zonisamide 100 mg daily and lamotrigine 150 mg twice a day. Will continue. 2/2023 Ambulatory EEG normal.  Had recent dental extraction with numbness in right V3 region (initially, involved entire right side of head and back of head). Not a stabbing, electric like pain, just numbness. Possible related to inferior alveolar nerve irritation during dental procedure as it happened just after it. It is getting better which is reassuring - can prescribe gabapentin. MRI brain w/wo contrast without brainstem pathology.   Doing well. Will get EEG and neuropsych testing for attention deficits.

## 2024-06-12 NOTE — DATA REVIEWED
[de-identified] : 6/12/2024: MRI brain w/wo contrast : bilateral cranial nerves VII and VIII and internal auditory meatus are symmetric in signal and morphology. No enhancing mass in cerebellopontine angle cisterns or membranous labyrinth.  [de-identified] : 2/2023 Ambulatory EEG: normal [de-identified] : MRI thoracic and lumbar spine 1/31/23: no acute fracture or subluxation of the thoracic or lumbar spine. Chronic compression  deformities of several upper/mid thoracic vertebral bodies. Mild bilateral foraminal narrowing  at L5/S1 secondary to posterior disc bulge and facet arthropathy. No additional areas of significant  central canal or neural foraminal narrowing within the thoracic or lumbar spine.   Labs: 9/2022 - lamotrigine 5.1 ; zonisamide <2   ; vitamin D 27.9 low  4/29/22: MRI thoracic spine: subacute mild compression deformity at T7 with multiple additional compression deformities in  upper to midthoracic spine.  6/29/21  Na 140  creatinine 0.9

## 2024-06-12 NOTE — PHYSICAL EXAM
[FreeTextEntry1] : Mental Status: alert, oriented. Speech fluent. III, IV, VI, EOMI VII normal squint vs resistance, normal smile, face symmetric  VIII: normal hearing  IX, X normal gag, symmetric palate, uvula raises midline  Sensory: normal to LT  Motor: full strength  Coordination: intact FNF Gait : normal balance and gait

## 2024-06-12 NOTE — ASSESSMENT
[FreeTextEntry1] : Please continue lamotrigine 150 mg every 12 hours Continue zonisamide 100 mg daily continue vitamin d 1000 units daily continue vitamin b complex magnesium at night Neuropsychological testing EEG   Return to clinic in five months to see Dr. Radha Strickland   Seizure Safety:   Wear a helmet when biking or horseback riding No unsupervised swimming Showers rather than baths - no bath alone - risk of drowning  Adjust the temperature on hot water heater to avoid scalding If uncontrolled seizures, use microwave rather than stovetop Dont lock door in bathroom, bedroom or on places  If fall off bed with seizures, try sleeping with mattress on the floor  Use soft or padded furniture  Avoid high ladders  Take medication as prescribed Follow driving regulations of people with epilepsy.  Please visit [facility] : [email or web address]

## 2024-06-18 ENCOUNTER — APPOINTMENT (OUTPATIENT)
Dept: FAMILY MEDICINE | Facility: CLINIC | Age: 34
End: 2024-06-18

## 2024-07-16 ENCOUNTER — APPOINTMENT (OUTPATIENT)
Dept: NEUROLOGY | Facility: CLINIC | Age: 34
End: 2024-07-16

## 2024-11-01 ENCOUNTER — RX RENEWAL (OUTPATIENT)
Age: 34
End: 2024-11-01

## 2024-11-12 ENCOUNTER — APPOINTMENT (OUTPATIENT)
Dept: NEUROLOGY | Facility: CLINIC | Age: 34
End: 2024-11-12
Payer: MEDICAID

## 2024-11-12 VITALS
OXYGEN SATURATION: 98 % | DIASTOLIC BLOOD PRESSURE: 86 MMHG | BODY MASS INDEX: 20.54 KG/M2 | HEART RATE: 86 BPM | HEIGHT: 73 IN | WEIGHT: 155 LBS | SYSTOLIC BLOOD PRESSURE: 129 MMHG

## 2024-11-12 PROCEDURE — 99214 OFFICE O/P EST MOD 30 MIN: CPT

## 2024-11-12 RX ORDER — DOXYCYCLINE HYCLATE 100 MG/1
100 TABLET ORAL
Qty: 14 | Refills: 0 | Status: ACTIVE | COMMUNITY
Start: 2024-11-12 | End: 1900-01-01

## 2024-11-12 RX ORDER — LAMOTRIGINE 300 MG/1
300 TABLET, EXTENDED RELEASE ORAL
Qty: 90 | Refills: 3 | Status: DISCONTINUED | COMMUNITY
Start: 2024-11-12 | End: 2024-11-12

## 2024-12-25 PROBLEM — F10.90 ALCOHOL USE: Status: ACTIVE | Noted: 2022-02-08

## 2025-03-19 ENCOUNTER — APPOINTMENT (OUTPATIENT)
Dept: FAMILY MEDICINE | Facility: CLINIC | Age: 35
End: 2025-03-19
Payer: MEDICAID

## 2025-03-19 ENCOUNTER — APPOINTMENT (OUTPATIENT)
Dept: FAMILY MEDICINE | Facility: CLINIC | Age: 35
End: 2025-03-19

## 2025-03-19 VITALS
SYSTOLIC BLOOD PRESSURE: 110 MMHG | DIASTOLIC BLOOD PRESSURE: 72 MMHG | HEART RATE: 89 BPM | TEMPERATURE: 97.5 F | OXYGEN SATURATION: 98 %

## 2025-03-19 DIAGNOSIS — J06.9 ACUTE UPPER RESPIRATORY INFECTION, UNSPECIFIED: ICD-10-CM

## 2025-03-19 PROCEDURE — G2211 COMPLEX E/M VISIT ADD ON: CPT | Mod: NC

## 2025-03-19 PROCEDURE — 99214 OFFICE O/P EST MOD 30 MIN: CPT

## 2025-03-19 RX ORDER — AZITHROMYCIN 500 MG/1
500 TABLET, FILM COATED ORAL DAILY
Qty: 1 | Refills: 0 | Status: ACTIVE | COMMUNITY
Start: 2025-03-19 | End: 1900-01-01

## 2025-03-19 RX ORDER — BENZONATATE 200 MG/1
200 CAPSULE ORAL
Qty: 21 | Refills: 0 | Status: ACTIVE | COMMUNITY
Start: 2025-03-19 | End: 1900-01-01

## 2025-04-14 ENCOUNTER — APPOINTMENT (OUTPATIENT)
Dept: NEUROLOGY | Facility: CLINIC | Age: 35
End: 2025-04-14

## 2025-04-17 ENCOUNTER — APPOINTMENT (OUTPATIENT)
Dept: NEUROLOGY | Facility: CLINIC | Age: 35
End: 2025-04-17

## 2025-04-28 ENCOUNTER — APPOINTMENT (OUTPATIENT)
Dept: FAMILY MEDICINE | Facility: CLINIC | Age: 35
End: 2025-04-28
Payer: MEDICAID

## 2025-04-28 VITALS
TEMPERATURE: 99.6 F | SYSTOLIC BLOOD PRESSURE: 112 MMHG | HEIGHT: 73 IN | BODY MASS INDEX: 23.06 KG/M2 | HEART RATE: 98 BPM | DIASTOLIC BLOOD PRESSURE: 64 MMHG | OXYGEN SATURATION: 97 % | WEIGHT: 174 LBS

## 2025-04-28 DIAGNOSIS — M26.609 UNSPECIFIED TEMPOROMANDIBULAR JOINT DISORDER: ICD-10-CM

## 2025-04-28 DIAGNOSIS — E53.8 DEFICIENCY OF OTHER SPECIFIED B GROUP VITAMINS: ICD-10-CM

## 2025-04-28 DIAGNOSIS — R41.840 ATTENTION AND CONCENTRATION DEFICIT: ICD-10-CM

## 2025-04-28 DIAGNOSIS — R53.83 OTHER FATIGUE: ICD-10-CM

## 2025-04-28 DIAGNOSIS — A69.20 LYME DISEASE, UNSPECIFIED: ICD-10-CM

## 2025-04-28 DIAGNOSIS — E55.9 VITAMIN D DEFICIENCY, UNSPECIFIED: ICD-10-CM

## 2025-04-28 DIAGNOSIS — Z00.00 ENCOUNTER FOR GENERAL ADULT MEDICAL EXAMINATION W/OUT ABNORMAL FINDINGS: ICD-10-CM

## 2025-04-28 PROCEDURE — 99395 PREV VISIT EST AGE 18-39: CPT

## 2025-04-29 LAB
25(OH)D3 SERPL-MCNC: 24.8 NG/ML
ALBUMIN SERPL ELPH-MCNC: 4.7 G/DL
ALP BLD-CCNC: 72 U/L
ALT SERPL-CCNC: 32 U/L
ANION GAP SERPL CALC-SCNC: 13 MMOL/L
APPEARANCE: CLEAR
AST SERPL-CCNC: 25 U/L
BASOPHILS # BLD AUTO: 0.11 K/UL
BASOPHILS NFR BLD AUTO: 1.6 %
BILIRUB SERPL-MCNC: 0.6 MG/DL
BILIRUBIN URINE: NEGATIVE
BLOOD URINE: NEGATIVE
BUN SERPL-MCNC: 11 MG/DL
CALCIUM SERPL-MCNC: 9.5 MG/DL
CHLORIDE SERPL-SCNC: 105 MMOL/L
CHOLEST SERPL-MCNC: 165 MG/DL
CO2 SERPL-SCNC: 21 MMOL/L
COLOR: YELLOW
CREAT SERPL-MCNC: 0.79 MG/DL
EGFRCR SERPLBLD CKD-EPI 2021: 118 ML/MIN/1.73M2
EOSINOPHIL # BLD AUTO: 0.34 K/UL
EOSINOPHIL NFR BLD AUTO: 4.8 %
ESTIMATED AVERAGE GLUCOSE: 97 MG/DL
FOLATE SERPL-MCNC: 16.4 NG/ML
GLUCOSE QUALITATIVE U: NEGATIVE MG/DL
GLUCOSE SERPL-MCNC: 94 MG/DL
HBA1C MFR BLD HPLC: 5 %
HCT VFR BLD CALC: 47.4 %
HDLC SERPL-MCNC: 56 MG/DL
HGB BLD-MCNC: 15.9 G/DL
IMM GRANULOCYTES NFR BLD AUTO: 0.6 %
KETONES URINE: NEGATIVE MG/DL
LDLC SERPL-MCNC: 94 MG/DL
LEUKOCYTE ESTERASE URINE: NEGATIVE
LYMPHOCYTES # BLD AUTO: 2.31 K/UL
LYMPHOCYTES NFR BLD AUTO: 32.8 %
MAN DIFF?: NORMAL
MCHC RBC-ENTMCNC: 29.1 PG
MCHC RBC-ENTMCNC: 33.5 G/DL
MCV RBC AUTO: 86.8 FL
MONOCYTES # BLD AUTO: 0.42 K/UL
MONOCYTES NFR BLD AUTO: 6 %
NEUTROPHILS # BLD AUTO: 3.83 K/UL
NEUTROPHILS NFR BLD AUTO: 54.2 %
NITRITE URINE: NEGATIVE
NONHDLC SERPL-MCNC: 109 MG/DL
PH URINE: 7
PLATELET # BLD AUTO: 338 K/UL
POTASSIUM SERPL-SCNC: 4.2 MMOL/L
PROT SERPL-MCNC: 6.8 G/DL
PROTEIN URINE: NEGATIVE MG/DL
RBC # BLD: 5.46 M/UL
RBC # FLD: 13.6 %
SODIUM SERPL-SCNC: 139 MMOL/L
SPECIFIC GRAVITY URINE: 1.02
TRIGL SERPL-MCNC: 83 MG/DL
TSH SERPL-ACNC: 1.04 UIU/ML
UROBILINOGEN URINE: 0.2 MG/DL
VIT B12 SERPL-MCNC: 607 PG/ML
WBC # FLD AUTO: 7.05 K/UL

## 2025-05-02 LAB — B BURGDOR DNA SPEC QL NAA+PROBE: NEGATIVE

## 2025-05-06 ENCOUNTER — RX RENEWAL (OUTPATIENT)
Age: 35
End: 2025-05-06

## 2025-06-16 ENCOUNTER — APPOINTMENT (OUTPATIENT)
Dept: NEUROLOGY | Facility: CLINIC | Age: 35
End: 2025-06-16

## 2025-07-07 ENCOUNTER — APPOINTMENT (OUTPATIENT)
Dept: NEUROLOGY | Facility: CLINIC | Age: 35
End: 2025-07-07

## 2025-07-07 PROCEDURE — 95816 EEG AWAKE AND DROWSY: CPT

## 2025-07-08 PROCEDURE — 95700 EEG CONT REC W/VID EEG TECH: CPT

## 2025-07-08 PROCEDURE — 95708 EEG WO VID EA 12-26HR UNMNTR: CPT

## 2025-07-08 PROCEDURE — 95719 EEG PHYS/QHP EA INCR W/O VID: CPT

## 2025-07-09 ENCOUNTER — APPOINTMENT (OUTPATIENT)
Dept: NEUROLOGY | Facility: CLINIC | Age: 35
End: 2025-07-09

## 2025-08-04 ENCOUNTER — APPOINTMENT (OUTPATIENT)
Dept: NEUROLOGY | Facility: CLINIC | Age: 35
End: 2025-08-04
Payer: MEDICAID

## 2025-08-04 VITALS
SYSTOLIC BLOOD PRESSURE: 123 MMHG | DIASTOLIC BLOOD PRESSURE: 78 MMHG | HEART RATE: 80 BPM | BODY MASS INDEX: 23.19 KG/M2 | HEIGHT: 73 IN | WEIGHT: 175 LBS | OXYGEN SATURATION: 98 %

## 2025-08-04 PROCEDURE — 99214 OFFICE O/P EST MOD 30 MIN: CPT

## 2025-08-04 RX ORDER — MULTIVIT-MIN/IRON/FOLIC ACID/K 18-600-40
50 MCG CAPSULE ORAL
Qty: 90 | Refills: 0 | Status: ACTIVE | COMMUNITY
Start: 2025-08-04 | End: 1900-01-01